# Patient Record
Sex: MALE | Race: WHITE | NOT HISPANIC OR LATINO
[De-identification: names, ages, dates, MRNs, and addresses within clinical notes are randomized per-mention and may not be internally consistent; named-entity substitution may affect disease eponyms.]

---

## 2017-04-14 ENCOUNTER — APPOINTMENT (OUTPATIENT)
Dept: PULMONOLOGY | Facility: CLINIC | Age: 62
End: 2017-04-14

## 2017-04-14 VITALS
HEART RATE: 72 BPM | WEIGHT: 199 LBS | RESPIRATION RATE: 12 BRPM | HEIGHT: 68 IN | SYSTOLIC BLOOD PRESSURE: 120 MMHG | BODY MASS INDEX: 30.16 KG/M2 | DIASTOLIC BLOOD PRESSURE: 84 MMHG | OXYGEN SATURATION: 100 %

## 2017-04-14 DIAGNOSIS — Z87.39 PERSONAL HISTORY OF OTHER DISEASES OF THE MUSCULOSKELETAL SYSTEM AND CONNECTIVE TISSUE: ICD-10-CM

## 2017-04-14 DIAGNOSIS — Z82.49 FAMILY HISTORY OF ISCHEMIC HEART DISEASE AND OTHER DISEASES OF THE CIRCULATORY SYSTEM: ICD-10-CM

## 2017-04-14 DIAGNOSIS — K21.9 GASTRO-ESOPHAGEAL REFLUX DISEASE W/OUT ESOPHAGITIS: ICD-10-CM

## 2017-04-14 DIAGNOSIS — Z78.9 OTHER SPECIFIED HEALTH STATUS: ICD-10-CM

## 2017-04-14 DIAGNOSIS — Z86.69 PERSONAL HISTORY OF OTHER DISEASES OF THE NERVOUS SYSTEM AND SENSE ORGANS: ICD-10-CM

## 2017-04-14 DIAGNOSIS — Z00.00 ENCOUNTER FOR GENERAL ADULT MEDICAL EXAMINATION W/OUT ABNORMAL FINDINGS: ICD-10-CM

## 2017-05-12 ENCOUNTER — APPOINTMENT (OUTPATIENT)
Dept: GASTROENTEROLOGY | Facility: CLINIC | Age: 62
End: 2017-05-12

## 2017-05-12 VITALS
WEIGHT: 190 LBS | HEIGHT: 68 IN | DIASTOLIC BLOOD PRESSURE: 94 MMHG | SYSTOLIC BLOOD PRESSURE: 141 MMHG | BODY MASS INDEX: 28.79 KG/M2 | RESPIRATION RATE: 14 BRPM | HEART RATE: 69 BPM

## 2017-05-12 DIAGNOSIS — Z86.39 PERSONAL HISTORY OF OTHER ENDOCRINE, NUTRITIONAL AND METABOLIC DISEASE: ICD-10-CM

## 2017-05-12 DIAGNOSIS — M1A.9XX0 CHRONIC GOUT, UNSPECIFIED, W/OUT TOPHUS (TOPHI): ICD-10-CM

## 2017-05-12 DIAGNOSIS — M50.20 OTHER CERVICAL DISC DISPLACEMENT, UNSPECIFIED CERVICAL REGION: ICD-10-CM

## 2017-05-12 DIAGNOSIS — I10 ESSENTIAL (PRIMARY) HYPERTENSION: ICD-10-CM

## 2017-05-12 DIAGNOSIS — M19.90 UNSPECIFIED OSTEOARTHRITIS, UNSPECIFIED SITE: ICD-10-CM

## 2017-05-20 ENCOUNTER — OUTPATIENT (OUTPATIENT)
Dept: OUTPATIENT SERVICES | Facility: HOSPITAL | Age: 62
LOS: 1 days | End: 2017-05-20
Payer: COMMERCIAL

## 2017-05-20 DIAGNOSIS — G47.33 OBSTRUCTIVE SLEEP APNEA (ADULT) (PEDIATRIC): ICD-10-CM

## 2017-05-20 PROCEDURE — 95810 POLYSOM 6/> YRS 4/> PARAM: CPT | Mod: 26

## 2017-05-20 PROCEDURE — 95810 POLYSOM 6/> YRS 4/> PARAM: CPT

## 2017-06-12 ENCOUNTER — APPOINTMENT (OUTPATIENT)
Dept: PULMONOLOGY | Facility: CLINIC | Age: 62
End: 2017-06-12

## 2017-06-12 VITALS — HEART RATE: 75 BPM | DIASTOLIC BLOOD PRESSURE: 60 MMHG | OXYGEN SATURATION: 98 % | SYSTOLIC BLOOD PRESSURE: 130 MMHG

## 2017-06-12 VITALS — BODY MASS INDEX: 28.59 KG/M2 | WEIGHT: 188 LBS

## 2017-06-12 DIAGNOSIS — Z87.438 PERSONAL HISTORY OF OTHER DISEASES OF MALE GENITAL ORGANS: ICD-10-CM

## 2017-06-12 RX ORDER — FEBUXOSTAT 80 MG/1
80 TABLET ORAL
Qty: 30 | Refills: 0 | Status: DISCONTINUED | COMMUNITY
Start: 2016-11-07 | End: 2017-06-12

## 2017-06-12 RX ORDER — DIAZEPAM 5 MG/1
5 TABLET ORAL
Refills: 0 | Status: DISCONTINUED | COMMUNITY
End: 2017-06-12

## 2017-07-09 ENCOUNTER — OUTPATIENT (OUTPATIENT)
Dept: OUTPATIENT SERVICES | Facility: HOSPITAL | Age: 62
LOS: 1 days | End: 2017-07-09
Payer: COMMERCIAL

## 2017-07-09 DIAGNOSIS — G47.30 SLEEP APNEA, UNSPECIFIED: ICD-10-CM

## 2017-07-09 PROCEDURE — 95811 POLYSOM 6/>YRS CPAP 4/> PARM: CPT | Mod: 26

## 2017-07-09 PROCEDURE — 95811 POLYSOM 6/>YRS CPAP 4/> PARM: CPT

## 2017-07-24 ENCOUNTER — APPOINTMENT (OUTPATIENT)
Dept: GASTROENTEROLOGY | Facility: GI CENTER | Age: 62
End: 2017-07-24

## 2017-08-16 ENCOUNTER — APPOINTMENT (OUTPATIENT)
Dept: PULMONOLOGY | Facility: CLINIC | Age: 62
End: 2017-08-16
Payer: COMMERCIAL

## 2017-08-16 VITALS
HEIGHT: 68 IN | DIASTOLIC BLOOD PRESSURE: 70 MMHG | SYSTOLIC BLOOD PRESSURE: 130 MMHG | WEIGHT: 189 LBS | HEART RATE: 78 BPM | BODY MASS INDEX: 28.64 KG/M2 | OXYGEN SATURATION: 97 %

## 2017-08-16 PROCEDURE — 99214 OFFICE O/P EST MOD 30 MIN: CPT

## 2017-08-16 RX ORDER — AZELASTINE HYDROCHLORIDE 137 UG/1
0.1 SPRAY, METERED NASAL
Qty: 30 | Refills: 0 | Status: DISCONTINUED | COMMUNITY
Start: 2016-12-29

## 2017-08-16 RX ORDER — OMEPRAZOLE 40 MG/1
40 CAPSULE, DELAYED RELEASE ORAL
Qty: 90 | Refills: 0 | Status: DISCONTINUED | COMMUNITY
Start: 2017-06-29

## 2017-08-16 RX ORDER — METHOCARBAMOL 500 MG/1
500 TABLET, FILM COATED ORAL
Qty: 40 | Refills: 0 | Status: DISCONTINUED | COMMUNITY
Start: 2017-06-30

## 2017-08-16 RX ORDER — ATENOLOL 25 MG/1
25 TABLET ORAL
Refills: 0 | Status: DISCONTINUED | COMMUNITY
End: 2017-08-16

## 2017-08-16 RX ORDER — NEBIVOLOL HYDROCHLORIDE 10 MG/1
10 TABLET ORAL
Qty: 30 | Refills: 0 | Status: DISCONTINUED | COMMUNITY
Start: 2017-06-29

## 2017-09-27 ENCOUNTER — APPOINTMENT (OUTPATIENT)
Dept: PULMONOLOGY | Facility: CLINIC | Age: 62
End: 2017-09-27
Payer: COMMERCIAL

## 2017-09-27 VITALS
OXYGEN SATURATION: 96 % | WEIGHT: 190 LBS | SYSTOLIC BLOOD PRESSURE: 128 MMHG | BODY MASS INDEX: 28.89 KG/M2 | DIASTOLIC BLOOD PRESSURE: 80 MMHG | HEART RATE: 66 BPM

## 2017-09-27 DIAGNOSIS — E66.9 OBESITY, UNSPECIFIED: ICD-10-CM

## 2017-09-27 PROCEDURE — 99214 OFFICE O/P EST MOD 30 MIN: CPT

## 2017-12-12 ENCOUNTER — OTHER (OUTPATIENT)
Age: 62
End: 2017-12-12

## 2017-12-12 ENCOUNTER — APPOINTMENT (OUTPATIENT)
Dept: PULMONOLOGY | Facility: CLINIC | Age: 62
End: 2017-12-12
Payer: COMMERCIAL

## 2017-12-12 VITALS
WEIGHT: 192 LBS | HEART RATE: 68 BPM | BODY MASS INDEX: 29.19 KG/M2 | DIASTOLIC BLOOD PRESSURE: 78 MMHG | SYSTOLIC BLOOD PRESSURE: 126 MMHG | OXYGEN SATURATION: 97 %

## 2017-12-12 PROCEDURE — 99214 OFFICE O/P EST MOD 30 MIN: CPT

## 2017-12-20 ENCOUNTER — OUTPATIENT (OUTPATIENT)
Dept: OUTPATIENT SERVICES | Facility: HOSPITAL | Age: 62
LOS: 1 days | End: 2017-12-20
Payer: COMMERCIAL

## 2017-12-20 ENCOUNTER — RESULT REVIEW (OUTPATIENT)
Age: 62
End: 2017-12-20

## 2017-12-20 ENCOUNTER — APPOINTMENT (OUTPATIENT)
Dept: GASTROENTEROLOGY | Facility: GI CENTER | Age: 62
End: 2017-12-20
Payer: COMMERCIAL

## 2017-12-20 DIAGNOSIS — K22.70 BARRETT'S ESOPHAGUS WITHOUT DYSPLASIA: ICD-10-CM

## 2017-12-20 DIAGNOSIS — K21.0 GASTRO-ESOPHAGEAL REFLUX DISEASE WITH ESOPHAGITIS: ICD-10-CM

## 2017-12-20 PROCEDURE — 43239 EGD BIOPSY SINGLE/MULTIPLE: CPT

## 2017-12-20 PROCEDURE — 88305 TISSUE EXAM BY PATHOLOGIST: CPT

## 2017-12-20 PROCEDURE — 88305 TISSUE EXAM BY PATHOLOGIST: CPT | Mod: 26

## 2017-12-22 LAB — SURGICAL PATHOLOGY FINAL REPORT - CH: SIGNIFICANT CHANGE UP

## 2018-02-07 ENCOUNTER — APPOINTMENT (OUTPATIENT)
Dept: GASTROENTEROLOGY | Facility: CLINIC | Age: 63
End: 2018-02-07
Payer: COMMERCIAL

## 2018-02-07 VITALS
BODY MASS INDEX: 27.22 KG/M2 | WEIGHT: 188 LBS | HEIGHT: 69.5 IN | HEART RATE: 69 BPM | SYSTOLIC BLOOD PRESSURE: 141 MMHG | DIASTOLIC BLOOD PRESSURE: 88 MMHG | RESPIRATION RATE: 16 BRPM

## 2018-02-07 PROCEDURE — 99214 OFFICE O/P EST MOD 30 MIN: CPT

## 2018-02-07 RX ORDER — NEBIVOLOL HYDROCHLORIDE 5 MG/1
5 TABLET ORAL
Refills: 0 | Status: COMPLETED | COMMUNITY
End: 2018-02-07

## 2018-02-07 RX ORDER — NORTRIPTYLINE HYDROCHLORIDE 25 MG/1
25 CAPSULE ORAL
Refills: 0 | Status: COMPLETED | COMMUNITY
End: 2018-02-07

## 2018-06-12 ENCOUNTER — APPOINTMENT (OUTPATIENT)
Dept: PULMONOLOGY | Facility: CLINIC | Age: 63
End: 2018-06-12
Payer: COMMERCIAL

## 2018-06-12 VITALS
OXYGEN SATURATION: 98 % | HEART RATE: 73 BPM | DIASTOLIC BLOOD PRESSURE: 84 MMHG | SYSTOLIC BLOOD PRESSURE: 128 MMHG | WEIGHT: 196 LBS | BODY MASS INDEX: 28.53 KG/M2

## 2018-06-12 PROCEDURE — 99214 OFFICE O/P EST MOD 30 MIN: CPT | Mod: 25

## 2018-06-12 PROCEDURE — 94010 BREATHING CAPACITY TEST: CPT

## 2018-06-12 RX ORDER — VERAPAMIL HYDROCHLORIDE 180 MG/1
180 CAPSULE, DELAYED RELEASE PELLETS ORAL
Refills: 0 | Status: DISCONTINUED | COMMUNITY
End: 2018-06-12

## 2018-06-12 RX ORDER — DULOXETINE HYDROCHLORIDE 30 MG/1
30 CAPSULE, DELAYED RELEASE PELLETS ORAL
Refills: 0 | Status: DISCONTINUED | COMMUNITY
End: 2018-06-12

## 2018-09-12 ENCOUNTER — OTHER (OUTPATIENT)
Age: 63
End: 2018-09-12

## 2018-09-12 DIAGNOSIS — R19.7 DIARRHEA, UNSPECIFIED: ICD-10-CM

## 2018-09-13 ENCOUNTER — LABORATORY RESULT (OUTPATIENT)
Age: 63
End: 2018-09-13

## 2018-09-14 LAB
ANION GAP SERPL CALC-SCNC: 17 MMOL/L
BASOPHILS # BLD AUTO: 0.01 K/UL
BASOPHILS NFR BLD AUTO: 0.2 %
BUN SERPL-MCNC: 13 MG/DL
CALCIUM SERPL-MCNC: 9.8 MG/DL
CHLORIDE SERPL-SCNC: 105 MMOL/L
CO2 SERPL-SCNC: 22 MMOL/L
CREAT SERPL-MCNC: 0.95 MG/DL
CRP SERPL-MCNC: 0.15 MG/DL
ENDOMYSIUM IGA SER QL: NEGATIVE
ENDOMYSIUM IGA TITR SER: NORMAL
EOSINOPHIL # BLD AUTO: 0.16 K/UL
EOSINOPHIL NFR BLD AUTO: 2.5 %
GLIADIN IGA SER QL: <5 UNITS
GLIADIN IGG SER QL: <5 UNITS
GLIADIN PEPTIDE IGA SER-ACNC: NEGATIVE
GLIADIN PEPTIDE IGG SER-ACNC: NEGATIVE
GLUCOSE BS SERPL-MCNC: 78 MG/DL
GLUCOSE SERPL-MCNC: 88 MG/DL
HCT VFR BLD CALC: 45.5 %
HGB BLD-MCNC: 15 G/DL
IGA SER QL IEP: 223 MG/DL
IMM GRANULOCYTES NFR BLD AUTO: 0.9 %
LYMPHOCYTES # BLD AUTO: 1.48 K/UL
LYMPHOCYTES NFR BLD AUTO: 22.7 %
MAN DIFF?: NORMAL
MCHC RBC-ENTMCNC: 33 GM/DL
MCHC RBC-ENTMCNC: 33.3 PG
MCV RBC AUTO: 101.1 FL
MONOCYTES # BLD AUTO: 0.51 K/UL
MONOCYTES NFR BLD AUTO: 7.8 %
NEUTROPHILS # BLD AUTO: 4.29 K/UL
NEUTROPHILS NFR BLD AUTO: 65.9 %
PLATELET # BLD AUTO: NORMAL K/UL
POTASSIUM SERPL-SCNC: 4.1 MMOL/L
RBC # BLD: 4.5 M/UL
RBC # FLD: 13.1 %
SODIUM SERPL-SCNC: 144 MMOL/L
T3FREE SERPL-MCNC: 3.39 PG/ML
T4 FREE SERPL-MCNC: 1.2 NG/DL
TSH SERPL-ACNC: 1.19 UIU/ML
TTG IGA SER IA-ACNC: <5 UNITS
TTG IGA SER-ACNC: NEGATIVE
TTG IGG SER IA-ACNC: <5 UNITS
TTG IGG SER IA-ACNC: NEGATIVE
WBC # FLD AUTO: 6.51 K/UL

## 2018-09-17 LAB
BACTERIA STL CULT: NORMAL
C DIFF TOX B STL QL CT TISS CULT: NORMAL

## 2018-10-05 ENCOUNTER — APPOINTMENT (OUTPATIENT)
Dept: GASTROENTEROLOGY | Facility: CLINIC | Age: 63
End: 2018-10-05
Payer: COMMERCIAL

## 2018-10-05 VITALS
SYSTOLIC BLOOD PRESSURE: 136 MMHG | WEIGHT: 191 LBS | HEIGHT: 69.5 IN | HEART RATE: 72 BPM | DIASTOLIC BLOOD PRESSURE: 82 MMHG | BODY MASS INDEX: 27.65 KG/M2

## 2018-10-05 PROCEDURE — 99215 OFFICE O/P EST HI 40 MIN: CPT

## 2018-11-14 ENCOUNTER — RX RENEWAL (OUTPATIENT)
Age: 63
End: 2018-11-14

## 2018-12-10 ENCOUNTER — APPOINTMENT (OUTPATIENT)
Dept: PULMONOLOGY | Facility: CLINIC | Age: 63
End: 2018-12-10
Payer: COMMERCIAL

## 2018-12-10 VITALS
BODY MASS INDEX: 28.24 KG/M2 | WEIGHT: 194 LBS | DIASTOLIC BLOOD PRESSURE: 70 MMHG | OXYGEN SATURATION: 97 % | HEART RATE: 72 BPM | SYSTOLIC BLOOD PRESSURE: 130 MMHG

## 2018-12-10 PROCEDURE — 99214 OFFICE O/P EST MOD 30 MIN: CPT

## 2018-12-11 ENCOUNTER — APPOINTMENT (OUTPATIENT)
Dept: GASTROENTEROLOGY | Facility: CLINIC | Age: 63
End: 2018-12-11
Payer: COMMERCIAL

## 2018-12-11 VITALS — WEIGHT: 191 LBS | OXYGEN SATURATION: 98 % | RESPIRATION RATE: 15 BRPM | HEIGHT: 69.5 IN | BODY MASS INDEX: 27.65 KG/M2

## 2018-12-11 VITALS — DIASTOLIC BLOOD PRESSURE: 89 MMHG | SYSTOLIC BLOOD PRESSURE: 145 MMHG | HEART RATE: 73 BPM

## 2018-12-11 PROCEDURE — 99214 OFFICE O/P EST MOD 30 MIN: CPT

## 2019-01-04 ENCOUNTER — RX RENEWAL (OUTPATIENT)
Age: 64
End: 2019-01-04

## 2019-01-07 ENCOUNTER — RX RENEWAL (OUTPATIENT)
Age: 64
End: 2019-01-07

## 2019-03-08 ENCOUNTER — RX RENEWAL (OUTPATIENT)
Age: 64
End: 2019-03-08

## 2019-03-10 ENCOUNTER — RX RENEWAL (OUTPATIENT)
Age: 64
End: 2019-03-10

## 2019-06-19 ENCOUNTER — MEDICATION RENEWAL (OUTPATIENT)
Age: 64
End: 2019-06-19

## 2019-06-25 ENCOUNTER — RX RENEWAL (OUTPATIENT)
Age: 64
End: 2019-06-25

## 2019-07-01 ENCOUNTER — MEDICATION RENEWAL (OUTPATIENT)
Age: 64
End: 2019-07-01

## 2019-08-16 ENCOUNTER — APPOINTMENT (OUTPATIENT)
Dept: GASTROENTEROLOGY | Facility: CLINIC | Age: 64
End: 2019-08-16
Payer: COMMERCIAL

## 2019-08-16 VITALS
WEIGHT: 214 LBS | OXYGEN SATURATION: 98 % | SYSTOLIC BLOOD PRESSURE: 153 MMHG | RESPIRATION RATE: 15 BRPM | DIASTOLIC BLOOD PRESSURE: 96 MMHG | HEIGHT: 69.5 IN | HEART RATE: 75 BPM | BODY MASS INDEX: 30.98 KG/M2

## 2019-08-16 DIAGNOSIS — R51 HEADACHE: ICD-10-CM

## 2019-08-16 PROCEDURE — 99214 OFFICE O/P EST MOD 30 MIN: CPT

## 2019-08-16 NOTE — PHYSICAL EXAM
[General Appearance - Alert] : alert [General Appearance - In No Acute Distress] : in no acute distress [Sclera] : the sclera and conjunctiva were normal [PERRL With Normal Accommodation] : pupils were equal in size, round, and reactive to light [Extraocular Movements] : extraocular movements were intact [Oropharynx] : the oropharynx was normal [Outer Ear] : the ears and nose were normal in appearance [Jugular Venous Distention Increased] : there was no jugular-venous distention [Neck Appearance] : the appearance of the neck was normal [Neck Cervical Mass (___cm)] : no neck mass was observed [Thyroid Diffuse Enlargement] : the thyroid was not enlarged [Thyroid Nodule] : there were no palpable thyroid nodules [Auscultation Breath Sounds / Voice Sounds] : lungs were clear to auscultation bilaterally [Heart Rate And Rhythm] : heart rate was normal and rhythm regular [Heart Sounds] : normal S1 and S2 [Heart Sounds Gallop] : no gallops [Murmurs] : no murmurs [Heart Sounds Pericardial Friction Rub] : no pericardial rub [Full Pulse] : the pedal pulses are present [Edema] : there was no peripheral edema [Bowel Sounds] : normal bowel sounds [Abdomen Tenderness] : non-tender [Abdomen Soft] : soft [Abdomen Mass (___ Cm)] : no abdominal mass palpated [Cervical Lymph Nodes Enlarged Posterior Bilaterally] : posterior cervical [Cervical Lymph Nodes Enlarged Anterior Bilaterally] : anterior cervical [Axillary Lymph Nodes Enlarged Bilaterally] : axillary [Supraclavicular Lymph Nodes Enlarged Bilaterally] : supraclavicular [Inguinal Lymph Nodes Enlarged Bilaterally] : inguinal [Femoral Lymph Nodes Enlarged Bilaterally] : femoral [Abnormal Walk] : normal gait [Nail Clubbing] : no clubbing  or cyanosis of the fingernails [Musculoskeletal - Swelling] : no joint swelling seen [Motor Tone] : muscle strength and tone were normal [Skin Turgor] : normal skin turgor [Skin Color & Pigmentation] : normal skin color and pigmentation [] : no rash [Oriented To Time, Place, And Person] : oriented to person, place, and time [No Focal Deficits] : no focal deficits [Impaired Insight] : insight and judgment were intact [Affect] : the affect was normal

## 2019-08-16 NOTE — HISTORY OF PRESENT ILLNESS
[de-identified] : The patient arrived for followup. He has history of acid reflux and diverticulosis. He is taking fiber supplementation and is passing bowel movements regularly. He is taking PPI and H2 blocker for his reflux. He is also complaining of chronic headaches for which he has undergone extensive neurology evaluation. Now he is being evaluated by psychiatry. His GI symptoms on controlled.

## 2019-08-16 NOTE — ASSESSMENT
[FreeTextEntry1] : The patient is doing okay from GI point of view. I am going to recommend to stop the PPI after tapering. He can continue the H2 blocker. Hopefully that can help with the headache. Followup in 3-4 months. Recommended to him to call me if there is any questions or concerns.\par \par Donte Lamar MD\par Gastroenterology \par \par

## 2019-09-24 ENCOUNTER — APPOINTMENT (OUTPATIENT)
Dept: GASTROENTEROLOGY | Facility: CLINIC | Age: 64
End: 2019-09-24

## 2019-10-02 ENCOUNTER — APPOINTMENT (OUTPATIENT)
Dept: GASTROENTEROLOGY | Facility: CLINIC | Age: 64
End: 2019-10-02
Payer: COMMERCIAL

## 2019-10-02 VITALS
SYSTOLIC BLOOD PRESSURE: 130 MMHG | OXYGEN SATURATION: 98 % | DIASTOLIC BLOOD PRESSURE: 84 MMHG | BODY MASS INDEX: 29.68 KG/M2 | WEIGHT: 205 LBS | RESPIRATION RATE: 16 BRPM | HEIGHT: 69.5 IN | HEART RATE: 70 BPM

## 2019-10-02 PROCEDURE — 99214 OFFICE O/P EST MOD 30 MIN: CPT

## 2019-10-02 NOTE — ASSESSMENT
[FreeTextEntry1] : Due to the new warning about Zantac, I recommended to stop it. I recommended to increase his PPI to once daily instead of every other day. If he has any worsening symptoms, we can add famotidine at bedtime. Followup on as needed basis

## 2019-10-02 NOTE — PHYSICAL EXAM
[General Appearance - Alert] : alert [General Appearance - In No Acute Distress] : in no acute distress [Sclera] : the sclera and conjunctiva were normal [PERRL With Normal Accommodation] : pupils were equal in size, round, and reactive to light [Extraocular Movements] : extraocular movements were intact [Oropharynx] : the oropharynx was normal [Outer Ear] : the ears and nose were normal in appearance [Neck Appearance] : the appearance of the neck was normal [Jugular Venous Distention Increased] : there was no jugular-venous distention [Neck Cervical Mass (___cm)] : no neck mass was observed [Thyroid Diffuse Enlargement] : the thyroid was not enlarged [Thyroid Nodule] : there were no palpable thyroid nodules [Auscultation Breath Sounds / Voice Sounds] : lungs were clear to auscultation bilaterally [Heart Rate And Rhythm] : heart rate was normal and rhythm regular [Heart Sounds] : normal S1 and S2 [Murmurs] : no murmurs [Heart Sounds Gallop] : no gallops [Heart Sounds Pericardial Friction Rub] : no pericardial rub [Full Pulse] : the pedal pulses are present [Edema] : there was no peripheral edema [Abdomen Soft] : soft [Bowel Sounds] : normal bowel sounds [Abdomen Tenderness] : non-tender [Abdomen Mass (___ Cm)] : no abdominal mass palpated [Cervical Lymph Nodes Enlarged Anterior Bilaterally] : anterior cervical [Cervical Lymph Nodes Enlarged Posterior Bilaterally] : posterior cervical [Supraclavicular Lymph Nodes Enlarged Bilaterally] : supraclavicular [Femoral Lymph Nodes Enlarged Bilaterally] : femoral [Axillary Lymph Nodes Enlarged Bilaterally] : axillary [Inguinal Lymph Nodes Enlarged Bilaterally] : inguinal [Abnormal Walk] : normal gait [Nail Clubbing] : no clubbing  or cyanosis of the fingernails [Musculoskeletal - Swelling] : no joint swelling seen [Skin Color & Pigmentation] : normal skin color and pigmentation [Motor Tone] : muscle strength and tone were normal [Skin Turgor] : normal skin turgor [] : no rash [Oriented To Time, Place, And Person] : oriented to person, place, and time [No Focal Deficits] : no focal deficits [Affect] : the affect was normal [Impaired Insight] : insight and judgment were intact

## 2019-10-02 NOTE — HISTORY OF PRESENT ILLNESS
[de-identified] : Patient for a followup for GI symptoms. He is on PPI every other day and H2 blocker at bedtime. He has no GI symptoms currently. He is still struggling with his headaches. He is under treatment with a neurologist and a psychiatrist. He will be undergoing an MRI soon.

## 2019-12-13 ENCOUNTER — APPOINTMENT (OUTPATIENT)
Dept: PULMONOLOGY | Facility: CLINIC | Age: 64
End: 2019-12-13
Payer: COMMERCIAL

## 2019-12-13 VITALS
DIASTOLIC BLOOD PRESSURE: 80 MMHG | BODY MASS INDEX: 30.55 KG/M2 | OXYGEN SATURATION: 98 % | SYSTOLIC BLOOD PRESSURE: 130 MMHG | HEART RATE: 74 BPM | WEIGHT: 211 LBS | HEIGHT: 69.5 IN

## 2019-12-13 PROCEDURE — 99214 OFFICE O/P EST MOD 30 MIN: CPT

## 2019-12-13 RX ORDER — SERTRALINE 25 MG/1
TABLET, FILM COATED ORAL
Refills: 0 | Status: DISCONTINUED | COMMUNITY
End: 2019-12-13

## 2019-12-13 RX ORDER — BUTALBITAL AND ACETAMINOPHEN 300; 50 MG/1; MG/1
50-300 TABLET ORAL
Refills: 0 | Status: DISCONTINUED | COMMUNITY
End: 2019-12-13

## 2019-12-13 RX ORDER — OLANZAPINE 5 MG/1
5 TABLET, FILM COATED ORAL
Refills: 0 | Status: DISCONTINUED | COMMUNITY
End: 2019-12-13

## 2019-12-13 NOTE — DISCUSSION/SUMMARY
[FreeTextEntry1] : \par #1. CPAP titration study to treat moderate SHELL revealed an optimal pressure of 12 cm of water; he has excellent compliance\par #2. Diet and exercise for weight loss\par #3. SOBOE is likely related to weight given normal PFTs and spirometry\par #4. F/u in 12 months to reassess response to CPAP\par #5. Neurology f/u for H/A\par #6. Lung function has been essentially normal\par #7. The patient does not appear to require BD therapy at this time\par #8. Replace equipment as needed; ordered 12/13/19

## 2019-12-13 NOTE — REVIEW OF SYSTEMS
[Dry Eyes] : dryness of the eyes [Sinus Problems] : sinus problems [Hypertension] : ~T hypertension [Headache] : headache [Anxiety] : anxiety [Depression] : depression [As Noted in HPI] : as noted in HPI [Fever] : no fever [Chills] : no chills [Eye Irritation] : no ~T irritation of the eyes [Nasal Congestion] : no nasal congestion [Epistaxis] : no nosebleeds [Postnasal Drip] : no postnasal drip [Cough] : no cough [Dyspnea] : no dyspnea [Sputum] : not coughing up ~M sputum [Pleuritic Pain] : no pleuritic pain [Chest Tightness] : no chest tightness [Chest Discomfort] : no chest discomfort [Wheezing] : no wheezing [Dysrhythmia] : no dysrhythmia [Murmurs] : no murmurs were heard [Palpitations] : no palpitations [Edema] : ~T edema was not present [Hay Fever] : no hay fever [Itchy Eyes] : no itching of ~T the eyes [Reflux] : no reflux [Nausea] : no nausea [Vomiting] : no vomiting [Constipation] : no constipation [Diarrhea] : no diarrhea [Dysuria] : no dysuria [Abdominal Pain] : no abdominal pain [Trauma] : no ~T physical trauma [Fracture] : no fracture [Anemia] : no anemia [Dizziness] : no dizziness [Syncope] : no fainting [Paralysis] : no paralysis was seen [Numbness] : no numbness [Diabetes] : no diabetes mellitus [Seizures] : no seizures [Thyroid Problem] : no thyroid problem

## 2019-12-13 NOTE — CONSULT LETTER
[Dear  ___] : Dear  [unfilled], [Consult Letter:] : I had the pleasure of evaluating your patient, [unfilled]. [Please see my note below.] : Please see my note below. [Consult Closing:] : Thank you very much for allowing me to participate in the care of this patient.  If you have any questions, please do not hesitate to contact me. [Sincerely,] : Sincerely, [DrJenifer  ___] : Dr. HERNANDEZ [Deng Davis MD] : Deng Davis MD [DrJenifer ___] : Dr. HERNANDEZ [FreeTextEntry3] : Deng Davis MD, FCCP, D. ABSM\par Pulmonary and Sleep Medicine\par [facility] [place]

## 2019-12-13 NOTE — PHYSICAL EXAM
[General Appearance - Well Developed] : well developed [Normal Appearance] : normal appearance [General Appearance - In No Acute Distress] : no acute distress [Normal Conjunctiva] : the conjunctiva exhibited no abnormalities [Low Lying Soft Palate] : low lying soft palate [Elongated Uvula] : elongated uvula [Enlarged Base of the Tongue] : enlargement of the base of the tongue [Neck Appearance] : the appearance of the neck was normal [III] : III [Heart Rate And Rhythm] : heart rate and rhythm were normal [Edema] : no peripheral edema present [Heart Sounds] : normal S1 and S2 [Murmurs] : no murmurs present [] : no respiratory distress [Respiration, Rhythm And Depth] : normal respiratory rhythm and effort [Exaggerated Use Of Accessory Muscles For Inspiration] : no accessory muscle use [Auscultation Breath Sounds / Voice Sounds] : lungs were clear to auscultation bilaterally [Abdomen Soft] : soft [Abdomen Tenderness] : non-tender [Nail Clubbing] : no clubbing of the fingernails [Abnormal Walk] : normal gait [No Focal Deficits] : no focal deficits [Oriented To Time, Place, And Person] : oriented to person, place, and time [Cyanosis, Localized] : no localized cyanosis [FreeTextEntry1] : No abnormalities

## 2019-12-13 NOTE — PROCEDURE
[FreeTextEntry1] : PFTs performed previously were normal.\par Spirometry performed subsequently was essentially normal

## 2019-12-13 NOTE — REASON FOR VISIT
[Follow-Up] : a follow-up visit [Shortness of Breath] : shortness of Breath [Sleep Apnea] : sleep apnea [FreeTextEntry2] : obesity

## 2019-12-13 NOTE — HISTORY OF PRESENT ILLNESS
[FreeTextEntry1] : Patient with mild SOBOE but is otherwise without respiratory complaints.\par He is s/p hernia surgery.\par He now follows with neurology for H/A.

## 2019-12-19 ENCOUNTER — MEDICATION RENEWAL (OUTPATIENT)
Age: 64
End: 2019-12-19

## 2020-12-18 ENCOUNTER — APPOINTMENT (OUTPATIENT)
Dept: PULMONOLOGY | Facility: CLINIC | Age: 65
End: 2020-12-18
Payer: MEDICARE

## 2020-12-18 VITALS
DIASTOLIC BLOOD PRESSURE: 94 MMHG | BODY MASS INDEX: 28.09 KG/M2 | OXYGEN SATURATION: 96 % | HEART RATE: 80 BPM | WEIGHT: 193 LBS | SYSTOLIC BLOOD PRESSURE: 140 MMHG

## 2020-12-18 PROCEDURE — 99072 ADDL SUPL MATRL&STAF TM PHE: CPT

## 2020-12-18 PROCEDURE — 99214 OFFICE O/P EST MOD 30 MIN: CPT

## 2020-12-18 RX ORDER — DULOXETINE HYDROCHLORIDE 60 MG/1
60 CAPSULE, DELAYED RELEASE ORAL
Refills: 0 | Status: DISCONTINUED | COMMUNITY
End: 2020-12-18

## 2020-12-18 RX ORDER — RANITIDINE 150 MG/1
150 TABLET ORAL
Qty: 90 | Refills: 1 | Status: DISCONTINUED | COMMUNITY
Start: 2018-10-05 | End: 2020-12-18

## 2020-12-18 NOTE — CONSULT LETTER
[Dear  ___] : Dear  [unfilled], [Consult Letter:] : I had the pleasure of evaluating your patient, [unfilled]. [Please see my note below.] : Please see my note below. [Consult Closing:] : Thank you very much for allowing me to participate in the care of this patient.  If you have any questions, please do not hesitate to contact me. [Sincerely,] : Sincerely, [DrJenifer  ___] : Dr. HERNANDEZ [DrJenifer ___] : Dr. HERNANDEZ [Deng Davis MD] : Deng Davis MD [FreeTextEntry3] : Deng Davis MD, FCCP, D. ABSM\par Pulmonary and Sleep Medicine\par [facility] [place]

## 2020-12-18 NOTE — DISCUSSION/SUMMARY
[FreeTextEntry1] : \par #1. CPAP titration study to treat moderate SHELL revealed an optimal pressure of 12 cm of water; he has excellent compliance\par #2. Diet and exercise for weight loss\par #3. SOBOE is likely related to weight given normal PFTs and spirometry\par #4. F/u in 6 months to reassess response to CPAP with the AirFit N30i\par #5. Neurology f/u for H/A\par #6. Lung function was essentially normal\par #7. The patient does not appear to require BD therapy at this time\par #8. Replace equipment as needed; ordered 12/18/20\par #9. Reviewed risks of exposure and symptoms of Covid-19 virus, including how the virus is spread and precautions to avoid marie virus.\par \par Patient's questions were answered and patient appears to understand these recommendations\par

## 2020-12-18 NOTE — REVIEW OF SYSTEMS
[Dry Eyes] : dryness of the eyes [Sinus Problems] : sinus problems [Hypertension] : ~T hypertension [Headache] : headache [Depression] : depression [Anxiety] : anxiety [As Noted in HPI] : as noted in HPI [Fever] : no fever [Chills] : no chills [Eye Irritation] : no ~T irritation of the eyes [Nasal Congestion] : no nasal congestion [Epistaxis] : no nosebleeds [Postnasal Drip] : no postnasal drip [Cough] : no cough [Sputum] : not coughing up ~M sputum [Dyspnea] : no dyspnea [Chest Tightness] : no chest tightness [Pleuritic Pain] : no pleuritic pain [Wheezing] : no wheezing [Chest Discomfort] : no chest discomfort [Dysrhythmia] : no dysrhythmia [Murmurs] : no murmurs were heard [Palpitations] : no palpitations [Edema] : ~T edema was not present [Hay Fever] : no hay fever [Itchy Eyes] : no itching of ~T the eyes [Reflux] : no reflux [Nausea] : no nausea [Vomiting] : no vomiting [Constipation] : no constipation [Diarrhea] : no diarrhea [Abdominal Pain] : no abdominal pain [Dysuria] : no dysuria [Trauma] : no ~T physical trauma [Fracture] : no fracture [Anemia] : no anemia [Dizziness] : no dizziness [Syncope] : no fainting [Numbness] : no numbness [Paralysis] : no paralysis was seen [Seizures] : no seizures [Diabetes] : no diabetes mellitus [Thyroid Problem] : no thyroid problem

## 2020-12-23 ENCOUNTER — APPOINTMENT (OUTPATIENT)
Dept: GASTROENTEROLOGY | Facility: CLINIC | Age: 65
End: 2020-12-23
Payer: MEDICARE

## 2020-12-23 VITALS
WEIGHT: 200 LBS | TEMPERATURE: 98 F | HEIGHT: 69.5 IN | BODY MASS INDEX: 28.96 KG/M2 | HEART RATE: 79 BPM | SYSTOLIC BLOOD PRESSURE: 150 MMHG | DIASTOLIC BLOOD PRESSURE: 94 MMHG

## 2020-12-23 DIAGNOSIS — R19.4 CHANGE IN BOWEL HABIT: ICD-10-CM

## 2020-12-23 PROCEDURE — 99072 ADDL SUPL MATRL&STAF TM PHE: CPT

## 2020-12-23 PROCEDURE — 99214 OFFICE O/P EST MOD 30 MIN: CPT

## 2020-12-23 NOTE — ASSESSMENT
[FreeTextEntry1] :  I am recommending to schedule upper endoscopy and continue PPI. Regarding his change in bowel habits, I am recommending to start Metamucil fiber supplementation on an daily basis.\par \par Risks (including bleeding, pain, perforation, incomplete examination, adverse reactions to medications, aspiration and death), benefits and alternatives were discussed. Patient is agreeable for the EGD. The patient is medically optimized for the procedure. We will schedule the patient for the procedure.\par \par Donte Lamar MD\par Gastroenterology \par \par

## 2020-12-23 NOTE — PHYSICAL EXAM
[General Appearance - Alert] : alert [General Appearance - In No Acute Distress] : in no acute distress [Sclera] : the sclera and conjunctiva were normal [PERRL With Normal Accommodation] : pupils were equal in size, round, and reactive to light [Extraocular Movements] : extraocular movements were intact [Outer Ear] : the ears and nose were normal in appearance [Oropharynx] : the oropharynx was normal [Neck Appearance] : the appearance of the neck was normal [Neck Cervical Mass (___cm)] : no neck mass was observed [Jugular Venous Distention Increased] : there was no jugular-venous distention [Thyroid Diffuse Enlargement] : the thyroid was not enlarged [Thyroid Nodule] : there were no palpable thyroid nodules [Auscultation Breath Sounds / Voice Sounds] : lungs were clear to auscultation bilaterally [Heart Rate And Rhythm] : heart rate was normal and rhythm regular [Heart Sounds] : normal S1 and S2 [Heart Sounds Gallop] : no gallops [Murmurs] : no murmurs [Heart Sounds Pericardial Friction Rub] : no pericardial rub [Full Pulse] : the pedal pulses are present [Edema] : there was no peripheral edema [Bowel Sounds] : normal bowel sounds [Abdomen Soft] : soft [Abdomen Tenderness] : non-tender [Abdomen Mass (___ Cm)] : no abdominal mass palpated [Cervical Lymph Nodes Enlarged Posterior Bilaterally] : posterior cervical [Cervical Lymph Nodes Enlarged Anterior Bilaterally] : anterior cervical [Supraclavicular Lymph Nodes Enlarged Bilaterally] : supraclavicular [Axillary Lymph Nodes Enlarged Bilaterally] : axillary [Femoral Lymph Nodes Enlarged Bilaterally] : femoral [Inguinal Lymph Nodes Enlarged Bilaterally] : inguinal [Abnormal Walk] : normal gait [Nail Clubbing] : no clubbing  or cyanosis of the fingernails [Musculoskeletal - Swelling] : no joint swelling seen [Motor Tone] : muscle strength and tone were normal [Skin Color & Pigmentation] : normal skin color and pigmentation [Skin Turgor] : normal skin turgor [] : no rash [No Focal Deficits] : no focal deficits [Oriented To Time, Place, And Person] : oriented to person, place, and time [Impaired Insight] : insight and judgment were intact [Affect] : the affect was normal

## 2020-12-23 NOTE — HISTORY OF PRESENT ILLNESS
[de-identified] : The patient arrived for followup. 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 He also has significant headaches. He has been getting spinal tap, with blood patch.He is complaining of occasional loose stools and has been taking Imodium.His last colonoscopy had just shown diverticulosis.No rectal bleeding.

## 2021-01-30 DIAGNOSIS — Z01.818 ENCOUNTER FOR OTHER PREPROCEDURAL EXAMINATION: ICD-10-CM

## 2021-01-31 ENCOUNTER — APPOINTMENT (OUTPATIENT)
Dept: DISASTER EMERGENCY | Facility: CLINIC | Age: 66
End: 2021-01-31

## 2021-02-01 LAB — SARS-COV-2 N GENE NPH QL NAA+PROBE: NOT DETECTED

## 2021-02-03 ENCOUNTER — APPOINTMENT (OUTPATIENT)
Dept: GASTROENTEROLOGY | Facility: GI CENTER | Age: 66
End: 2021-02-03
Payer: MEDICARE

## 2021-02-03 ENCOUNTER — RESULT REVIEW (OUTPATIENT)
Age: 66
End: 2021-02-03

## 2021-02-03 ENCOUNTER — OUTPATIENT (OUTPATIENT)
Dept: OUTPATIENT SERVICES | Facility: HOSPITAL | Age: 66
LOS: 1 days | End: 2021-02-03
Payer: MEDICARE

## 2021-02-03 DIAGNOSIS — K22.70 BARRETT'S ESOPHAGUS WITHOUT DYSPLASIA: ICD-10-CM

## 2021-02-03 PROCEDURE — 43239 EGD BIOPSY SINGLE/MULTIPLE: CPT

## 2021-02-03 PROCEDURE — 88305 TISSUE EXAM BY PATHOLOGIST: CPT

## 2021-02-03 PROCEDURE — 88342 IMHCHEM/IMCYTCHM 1ST ANTB: CPT

## 2021-02-03 PROCEDURE — 88342 IMHCHEM/IMCYTCHM 1ST ANTB: CPT | Mod: 26

## 2021-02-03 PROCEDURE — 88305 TISSUE EXAM BY PATHOLOGIST: CPT | Mod: 26

## 2021-02-03 NOTE — ASSESSMENT
[FreeTextEntry1] : IMPRESSION:\par Irregular Z line \par Melvin esophagus\par Mild gastritis\par \par RECOMMENDATIONS:\par Await pathology results\par Treat for H. pylori if positive\par Repeat EGD based on pathology results

## 2021-02-03 NOTE — PROCEDURE
[Procedure Explained] : The procedure was explained [Allergies Reviewed] : allergies reviewed. [Risks] : Risks [Benefits] : benefits [Alternatives] : alternatives [Consent Obtained] : written consent was obtained prior to the procedure and is detailed in the patient's record [Patient] : the patient [Automated Blood Pressure Cuff] : automated blood pressure cuff [Cardiac Monitor] : cardiac monitor [Pulse Oximeter] : pulse oximeter [Performed By: ___] : Performed by:  MARY [Melvin's] : Melvin's [Biopsy] : biopsy [Erythema] : erythema [Normal] : Normal [Sent to Pathology] : was sent to pathology for analysis [Tolerated Well] : the patient tolerated the procedure well [Vital Signs Stable] : the vital signs were stable [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 No nodularity noted [de-identified] : s/p cold biopsy [de-identified] : s/p cold biopsy [de-identified] : Random gastric; Distal esophagus 38-40 cm

## 2021-02-03 NOTE — PROCEDURE
[Procedure Explained] : The procedure was explained [Allergies Reviewed] : allergies reviewed. [Risks] : Risks [Benefits] : benefits [Alternatives] : alternatives [Consent Obtained] : written consent was obtained prior to the procedure and is detailed in the patient's record [Patient] : the patient [Automated Blood Pressure Cuff] : automated blood pressure cuff [Cardiac Monitor] : cardiac monitor [Pulse Oximeter] : pulse oximeter [Performed By: ___] : Performed by:  MARY [Melvin's] : Melvin's [Biopsy] : biopsy [Erythema] : erythema [Normal] : Normal [Sent to Pathology] : was sent to pathology for analysis [Tolerated Well] : the patient tolerated the procedure well [Vital Signs Stable] : the vital signs were stable [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 No nodularity noted [de-identified] : s/p cold biopsy [de-identified] : s/p cold biopsy [de-identified] : Random gastric; Distal esophagus 38-40 cm

## 2021-02-05 LAB — SURGICAL PATHOLOGY STUDY: SIGNIFICANT CHANGE UP

## 2021-02-12 ENCOUNTER — NON-APPOINTMENT (OUTPATIENT)
Age: 66
End: 2021-02-12

## 2021-06-18 ENCOUNTER — APPOINTMENT (OUTPATIENT)
Dept: PULMONOLOGY | Facility: CLINIC | Age: 66
End: 2021-06-18
Payer: MEDICARE

## 2021-06-18 VITALS — HEART RATE: 73 BPM | OXYGEN SATURATION: 97 %

## 2021-06-18 VITALS — SYSTOLIC BLOOD PRESSURE: 116 MMHG | DIASTOLIC BLOOD PRESSURE: 66 MMHG

## 2021-06-18 PROCEDURE — 94010 BREATHING CAPACITY TEST: CPT

## 2021-06-18 PROCEDURE — 99214 OFFICE O/P EST MOD 30 MIN: CPT | Mod: 25

## 2021-06-18 NOTE — HISTORY OF PRESENT ILLNESS
[Former] : former [None] : No associated symptoms are reported [CPAP] : CPAP [Good Compliance] : good compliance with treatment [Good Tolerance] : good tolerance of treatment [Good Symptom Control] : good symptom control [Follow-Up - Routine Clinic] : a routine clinic follow-up of [Currently Experiencing] : The patient is currently experiencing symptoms. [Dyspnea] : dyspnea [Back Pain] : back pain [Low Calorie Diet] : low calorie diet [Fair Compliance] : fair compliance with treatment [Fair Tolerance] : fair tolerance of treatment [Poor Symptom Control] : poor symptom control [Sleep Apnea] : sleep apnea [High] : high [Low Calorie] : low calorie [Well Balanced Diet] : well balanced meals [Infrequently] : exercises infrequently [Walking] : walking [TextBox_11] : .5 [TextBox_4] : Patient with mild SOBOE but is otherwise without respiratory complaints.\par He is s/p hernia surgery.\par He now follows with neurology for H/A.\par Pt is a former smoker of 1/2 ppd x 8 years, quit 1978\par He presents for evaluation prior to his upcoming back surgery. [TextBox_13] : 8 [YearQuit] : 1978 [de-identified] : at 12 cm of water

## 2021-06-18 NOTE — PHYSICAL EXAM
[General Appearance - Well Developed] : well developed [Normal Appearance] : normal appearance [General Appearance - In No Acute Distress] : no acute distress [Normal Conjunctiva] : the conjunctiva exhibited no abnormalities [Low Lying Soft Palate] : low lying soft palate [Elongated Uvula] : elongated uvula [Enlarged Base of the Tongue] : enlargement of the base of the tongue [III] : III [Neck Appearance] : the appearance of the neck was normal [Heart Rate And Rhythm] : heart rate and rhythm were normal [Murmurs] : no murmurs present [Heart Sounds] : normal S1 and S2 [Edema] : no peripheral edema present [] : no respiratory distress [Respiration, Rhythm And Depth] : normal respiratory rhythm and effort [Auscultation Breath Sounds / Voice Sounds] : lungs were clear to auscultation bilaterally [Exaggerated Use Of Accessory Muscles For Inspiration] : no accessory muscle use [Abdomen Soft] : soft [Abdomen Tenderness] : non-tender [Abnormal Walk] : normal gait [Nail Clubbing] : no clubbing of the fingernails [Cyanosis, Localized] : no localized cyanosis [No Focal Deficits] : no focal deficits [Oriented To Time, Place, And Person] : oriented to person, place, and time [FreeTextEntry1] : No abnormalities

## 2021-06-18 NOTE — PROCEDURE
[FreeTextEntry1] : PFTs performed previously were normal.\par Spirometry performed subsequently was essentially normal\par Spirometry 6/18/21 - normal

## 2021-06-18 NOTE — CONSULT LETTER
[Dear  ___] : Dear  [unfilled], [Consult Letter:] : I had the pleasure of evaluating your patient, [unfilled]. [Please see my note below.] : Please see my note below. [Consult Closing:] : Thank you very much for allowing me to participate in the care of this patient.  If you have any questions, please do not hesitate to contact me. [Sincerely,] : Sincerely, [DrJenifer  ___] : Dr. HERNANDEZ [FreeTextEntry3] : Deng Davis MD, FCCP, D. ABSM\par Pulmonary and Sleep Medicine\par [facility] [place] [DrJenifer ___] : Dr. HERNANDEZ

## 2021-06-18 NOTE — DISCUSSION/SUMMARY
[FreeTextEntry1] : \par #1. CPAP titration study to treat moderate SHELL revealed an optimal pressure of 12 cm of water; he has excellent compliance and is doing well\par #2. Diet and exercise for weight loss\par #3. SOBOE is likely related to weight given normal PFTs and spirometry\par #4. F/u in 3-6 months to reassess response to CPAP with the AirFit N30i\par #5. Neurology f/u for H/A\par #6. Lung function was essentially normal\par #7. The patient does not appear to require BD therapy at this time\par #8. Replace equipment as needed; ordered 12/18/20\par #9. There is no pulmonary contraindication for the patient's upcoming back surgery  I would recommend that CPAP at 12 cm of water should be available to use post-op and with sleep. I would also recommend post op incentive spirometry, GI/DVT prophylaxis as needed, early ambulation as able, and adequate pain control. Would recommend that O2 saturation should be monitored during and after the procedure. \par #10. Pt had both Covid vaccines \par #11. Reviewed risks of exposure and symptoms of Covid-19 virus, including how the virus is spread and precautions to avoid marie virus.\par \par Patient's questions were answered and patient appears to understand these recommendations

## 2021-06-18 NOTE — REVIEW OF SYSTEMS
[Dry Eyes] : dryness of the eyes [Sinus Problems] : sinus problems [Hypertension] : ~T hypertension [Headache] : headache [Depression] : depression [Anxiety] : anxiety [As Noted in HPI] : as noted in HPI [Fever] : no fever [Chills] : no chills [Eye Irritation] : no ~T irritation of the eyes [Epistaxis] : no nosebleeds [Nasal Congestion] : no nasal congestion [Postnasal Drip] : no postnasal drip [Cough] : no cough [Sputum] : not coughing up ~M sputum [Dyspnea] : no dyspnea [Chest Tightness] : no chest tightness [Pleuritic Pain] : no pleuritic pain [Wheezing] : no wheezing [Chest Discomfort] : no chest discomfort [Dysrhythmia] : no dysrhythmia [Murmurs] : no murmurs were heard [Palpitations] : no palpitations [Edema] : ~T edema was not present [Hay Fever] : no hay fever [Itchy Eyes] : no itching of ~T the eyes [Reflux] : no reflux [Nausea] : no nausea [Vomiting] : no vomiting [Constipation] : no constipation [Diarrhea] : no diarrhea [Abdominal Pain] : no abdominal pain [Dysuria] : no dysuria [Trauma] : no ~T physical trauma [Fracture] : no fracture [Anemia] : no anemia [Dizziness] : no dizziness [Syncope] : no fainting [Numbness] : no numbness [Paralysis] : no paralysis was seen [Seizures] : no seizures [Diabetes] : no diabetes mellitus [Thyroid Problem] : no thyroid problem

## 2021-10-29 ENCOUNTER — APPOINTMENT (OUTPATIENT)
Dept: PULMONOLOGY | Facility: CLINIC | Age: 66
End: 2021-10-29
Payer: MEDICARE

## 2021-10-29 VITALS
BODY MASS INDEX: 29.69 KG/M2 | SYSTOLIC BLOOD PRESSURE: 132 MMHG | DIASTOLIC BLOOD PRESSURE: 84 MMHG | OXYGEN SATURATION: 98 % | WEIGHT: 204 LBS | HEART RATE: 72 BPM | RESPIRATION RATE: 16 BRPM

## 2021-10-29 PROCEDURE — 99214 OFFICE O/P EST MOD 30 MIN: CPT

## 2021-10-29 NOTE — DISCUSSION/SUMMARY
[FreeTextEntry1] : \par #1. CPAP titration study to treat moderate SHELL revealed an optimal pressure of 12 cm of water; he has excellent compliance and is doing well\par #2. Diet and exercise for weight loss\par #3. SOBOE is likely related to weight given normal PFTs and spirometry\par #4. F/u in 6 months to assess response to Eson 2 nasal mask given to pt\par #5. Neurology f/u for H/A\par #6. Lung function was essentially normal previously\par #7. The patient does not appear to require BD therapy at this time\par #8. Replace equipment as needed; ordered 10/29/21\par #9. Pt had both Covid vaccines and flu vaccine\par #10. Reviewed risks of exposure and symptoms of Covid-19 virus, including how the virus is spread and precautions to avoid marie virus.\par \par Patient's questions were answered and patient appears to understand these recommendations

## 2021-10-29 NOTE — CONSULT LETTER
[Dear  ___] : Dear  [unfilled], [Consult Letter:] : I had the pleasure of evaluating your patient, [unfilled]. [Please see my note below.] : Please see my note below. [Consult Closing:] : Thank you very much for allowing me to participate in the care of this patient.  If you have any questions, please do not hesitate to contact me. [Sincerely,] : Sincerely, [DrJenifer  ___] : Dr. HERNANDEZ [FreeTextEntry3] : Deng Davis MD, FCCP, D. ABSM\par Pulmonary and Sleep Medicine\par [facility] [place]

## 2021-10-29 NOTE — HISTORY OF PRESENT ILLNESS
[Former] : former [None] : No associated symptoms are reported [CPAP] : CPAP [Good Compliance] : good compliance with treatment [Good Tolerance] : good tolerance of treatment [Good Symptom Control] : good symptom control [Follow-Up - Routine Clinic] : a routine clinic follow-up of [Currently Experiencing] : The patient is currently experiencing symptoms. [Dyspnea] : dyspnea [Back Pain] : back pain [Low Calorie Diet] : low calorie diet [Fair Compliance] : fair compliance with treatment [Fair Tolerance] : fair tolerance of treatment [Poor Symptom Control] : poor symptom control [Sleep Apnea] : sleep apnea [High] : high [Low Calorie] : low calorie [Well Balanced Diet] : well balanced meals [Infrequently] : exercises infrequently [Walking] : walking [TextBox_4] : Patient with mild SOBOE but is otherwise without respiratory complaints.\par He is s/p hernia surgery.\par He now follows with neurology for H/A.\par Pt is a former smoker of 1/2 ppd x 8 years, quit 1978\par Pt is s/p back surgery. [TextBox_11] : .5 [TextBox_13] : 8 [YearQuit] : 1978 [de-identified] : at 12 cm of water

## 2021-11-02 ENCOUNTER — APPOINTMENT (OUTPATIENT)
Dept: PULMONOLOGY | Facility: CLINIC | Age: 66
End: 2021-11-02

## 2021-11-03 ENCOUNTER — APPOINTMENT (OUTPATIENT)
Dept: GASTROENTEROLOGY | Facility: CLINIC | Age: 66
End: 2021-11-03
Payer: MEDICARE

## 2021-11-03 VITALS
SYSTOLIC BLOOD PRESSURE: 160 MMHG | DIASTOLIC BLOOD PRESSURE: 100 MMHG | HEART RATE: 69 BPM | RESPIRATION RATE: 14 BRPM | TEMPERATURE: 97.2 F | OXYGEN SATURATION: 98 % | HEIGHT: 69 IN | WEIGHT: 205 LBS | BODY MASS INDEX: 30.36 KG/M2

## 2021-11-03 DIAGNOSIS — K22.70 BARRETT'S ESOPHAGUS W/OUT DYSPLASIA: ICD-10-CM

## 2021-11-03 PROCEDURE — 99212 OFFICE O/P EST SF 10 MIN: CPT

## 2021-11-03 NOTE — HISTORY OF PRESENT ILLNESS
[de-identified] :  The patient arrived for followup. 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 He also has significant headaches. He has been getting spinal tap, with blood patch.He is complaining of occasional loose stools and has been taking Imodium.His last colonoscopy had just shown diverticulosis.No rectal bleeding. Last EGD was in 02/2020. Last colonoscopy was in 2014 and it had shown diverticulosis.  He has no current symptoms.  The last EGD did not reveal any evidence of Melvin's.  He is on omeprazole 40 mg.

## 2021-11-03 NOTE — ASSESSMENT
[FreeTextEntry1] : At this time, I have recommended to consider tapering down the dose of omeprazole to 20 mg orally daily.  As per the patient he is having labs routinely.  I have recommended a 6 to 12-month follow-up.  Further repeat EGD in 5 years.\par \par Donte Lamar MD\par Gastroenterology \par \par

## 2022-03-07 ENCOUNTER — APPOINTMENT (OUTPATIENT)
Dept: CARDIOLOGY | Facility: CLINIC | Age: 67
End: 2022-03-07

## 2022-03-07 VITALS
HEIGHT: 69 IN | DIASTOLIC BLOOD PRESSURE: 86 MMHG | WEIGHT: 206 LBS | RESPIRATION RATE: 16 BRPM | BODY MASS INDEX: 30.51 KG/M2 | OXYGEN SATURATION: 97 % | HEART RATE: 75 BPM | SYSTOLIC BLOOD PRESSURE: 133 MMHG

## 2022-03-07 DIAGNOSIS — Z82.49 FAMILY HISTORY OF ISCHEMIC HEART DISEASE AND OTHER DISEASES OF THE CIRCULATORY SYSTEM: ICD-10-CM

## 2022-03-07 RX ORDER — LAMOTRIGINE 200 MG/1
200 TABLET ORAL
Refills: 0 | Status: DISCONTINUED | COMMUNITY
End: 2022-03-07

## 2022-03-07 RX ORDER — FENOFIBRATE 160 MG/1
160 TABLET ORAL DAILY
Qty: 90 | Refills: 1 | Status: ACTIVE | COMMUNITY

## 2022-03-07 RX ORDER — ATORVASTATIN CALCIUM 40 MG/1
40 TABLET, FILM COATED ORAL
Qty: 90 | Refills: 1 | Status: ACTIVE | COMMUNITY

## 2022-03-07 RX ORDER — FLUTICASONE PROPIONATE 50 UG/1
50 SPRAY, METERED NASAL
Refills: 0 | Status: ACTIVE | COMMUNITY

## 2022-03-07 RX ORDER — OMEPRAZOLE 20 MG/1
20 CAPSULE, DELAYED RELEASE ORAL
Qty: 30 | Refills: 3 | Status: DISCONTINUED | COMMUNITY
Start: 2018-11-14 | End: 2022-03-07

## 2022-03-07 RX ORDER — ALPRAZOLAM 2 MG/1
TABLET ORAL
Refills: 0 | Status: DISCONTINUED | COMMUNITY
End: 2022-03-07

## 2022-03-07 RX ORDER — ACETAMINOPHEN 500 MG
500 TABLET ORAL
Refills: 0 | Status: DISCONTINUED | COMMUNITY
End: 2022-03-07

## 2022-03-07 RX ORDER — AZELASTINE HYDROCHLORIDE 137 UG/1
0.1 SPRAY, METERED NASAL
Refills: 0 | Status: DISCONTINUED | COMMUNITY
End: 2022-03-07

## 2022-03-07 RX ORDER — MONTELUKAST 10 MG/1
10 TABLET, FILM COATED ORAL DAILY
Qty: 90 | Refills: 3 | Status: ACTIVE | COMMUNITY

## 2022-03-07 RX ORDER — HYDROCODONE/ACETAMINOPHEN 5 MG-500MG
5-500 TABLET ORAL
Refills: 0 | Status: DISCONTINUED | COMMUNITY
End: 2022-03-07

## 2022-03-07 RX ORDER — ATENOLOL 50 MG/1
50 TABLET ORAL DAILY
Qty: 90 | Refills: 0 | Status: ACTIVE | COMMUNITY

## 2022-04-13 RX ORDER — OMEPRAZOLE 40 MG/1
40 CAPSULE, DELAYED RELEASE ORAL
Qty: 90 | Refills: 3 | Status: ACTIVE | COMMUNITY
Start: 2017-12-20 | End: 1900-01-01

## 2022-04-27 ENCOUNTER — APPOINTMENT (OUTPATIENT)
Dept: PULMONOLOGY | Facility: CLINIC | Age: 67
End: 2022-04-27

## 2022-05-18 ENCOUNTER — APPOINTMENT (OUTPATIENT)
Dept: GASTROENTEROLOGY | Facility: CLINIC | Age: 67
End: 2022-05-18
Payer: MEDICARE

## 2022-05-18 VITALS
HEIGHT: 69 IN | BODY MASS INDEX: 30.66 KG/M2 | RESPIRATION RATE: 14 BRPM | OXYGEN SATURATION: 98 % | SYSTOLIC BLOOD PRESSURE: 128 MMHG | DIASTOLIC BLOOD PRESSURE: 82 MMHG | WEIGHT: 207 LBS | HEART RATE: 74 BPM

## 2022-05-18 DIAGNOSIS — Z78.9 OTHER SPECIFIED HEALTH STATUS: ICD-10-CM

## 2022-05-18 DIAGNOSIS — K21.9 GASTRO-ESOPHAGEAL REFLUX DISEASE W/OUT ESOPHAGITIS: ICD-10-CM

## 2022-05-18 PROCEDURE — 99212 OFFICE O/P EST SF 10 MIN: CPT

## 2022-05-18 NOTE — HISTORY OF PRESENT ILLNESS
[de-identified] :  The patient arrived for followup. 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 He also has significant headaches. He has been getting spinal tap, with blood patch.He is complaining of occasional loose stools and has been taking Imodium.His last colonoscopy had just shown diverticulosis.No rectal bleeding. Last EGD was in 02/2020. Last colonoscopy was in 2014 and it had shown diverticulosis. He has no current symptoms. The last EGD did not reveal any evidence of Melvin's. He is on omeprazole 40 mg. \par \par He has been following up with pain management.  He is getting neck injections and has been on medications.  His GI symptoms are under control.

## 2022-05-18 NOTE — ASSESSMENT
[FreeTextEntry1] : He is doing well from reflux point of view.  He is having no GI symptoms.  He is currently on PPI therapy.  He will follow-up with his pain management doctors.  Follow-up with us in 6 months.  Continue PPI.  Will order labs next visit.

## 2023-04-17 ENCOUNTER — APPOINTMENT (OUTPATIENT)
Dept: PULMONOLOGY | Facility: CLINIC | Age: 68
End: 2023-04-17
Payer: MEDICARE

## 2023-04-17 VITALS
SYSTOLIC BLOOD PRESSURE: 126 MMHG | DIASTOLIC BLOOD PRESSURE: 74 MMHG | WEIGHT: 198 LBS | BODY MASS INDEX: 29.33 KG/M2 | HEART RATE: 64 BPM | OXYGEN SATURATION: 97 % | HEIGHT: 69 IN | RESPIRATION RATE: 16 BRPM

## 2023-04-17 PROCEDURE — 99214 OFFICE O/P EST MOD 30 MIN: CPT

## 2023-04-17 RX ORDER — PREGABALIN 150 MG/1
150 CAPSULE ORAL
Qty: 60 | Refills: 0 | Status: ACTIVE | COMMUNITY
Start: 2023-03-16

## 2023-04-17 RX ORDER — TIZANIDINE 4 MG/1
4 TABLET ORAL
Qty: 90 | Refills: 0 | Status: ACTIVE | COMMUNITY
Start: 2023-02-22

## 2023-04-17 RX ORDER — OXYCODONE AND ACETAMINOPHEN 7.5; 325 MG/1; MG/1
7.5-325 TABLET ORAL
Qty: 20 | Refills: 0 | Status: ACTIVE | COMMUNITY
Start: 2023-02-08

## 2023-04-17 RX ORDER — DESVENLAFAXINE 25 MG/1
25 TABLET, EXTENDED RELEASE ORAL
Qty: 14 | Refills: 0 | Status: ACTIVE | COMMUNITY
Start: 2023-02-11

## 2023-04-17 RX ORDER — AMLODIPINE BESYLATE 5 MG/1
5 TABLET ORAL
Qty: 90 | Refills: 0 | Status: ACTIVE | COMMUNITY
Start: 2022-10-16

## 2023-04-17 RX ORDER — TOBRAMYCIN AND DEXAMETHASONE 3; 1 MG/ML; MG/ML
0.3-0.1 SUSPENSION/ DROPS OPHTHALMIC
Qty: 5 | Refills: 0 | Status: ACTIVE | COMMUNITY
Start: 2023-01-14

## 2023-04-17 RX ORDER — OXYCODONE HYDROCHLORIDE AND ACETAMINOPHEN 10; 325 MG/1; MG/1
10-325 TABLET ORAL
Refills: 0 | Status: ACTIVE | COMMUNITY

## 2023-04-17 RX ORDER — VENLAFAXINE 25 MG/1
25 TABLET ORAL
Qty: 60 | Refills: 0 | Status: ACTIVE | COMMUNITY
Start: 2022-11-18

## 2023-04-17 RX ORDER — DESVENLAFAXINE 50 MG/1
50 TABLET, EXTENDED RELEASE ORAL
Qty: 30 | Refills: 0 | Status: ACTIVE | COMMUNITY
Start: 2023-02-25

## 2023-04-17 RX ORDER — RIZATRIPTAN BENZOATE 10 MG/1
10 TABLET, ORALLY DISINTEGRATING ORAL
Qty: 12 | Refills: 0 | Status: ACTIVE | COMMUNITY
Start: 2022-09-13

## 2023-04-17 RX ORDER — METHOCARBAMOL 750 MG/1
750 TABLET, FILM COATED ORAL
Qty: 90 | Refills: 0 | Status: ACTIVE | COMMUNITY
Start: 2023-01-09

## 2023-04-17 RX ORDER — TRAMADOL HYDROCHLORIDE AND ACETAMINOPHEN 37.5; 325 MG/1; MG/1
37.5-325 TABLET, FILM COATED ORAL
Qty: 20 | Refills: 0 | Status: ACTIVE | COMMUNITY
Start: 2022-10-24

## 2023-04-17 RX ORDER — CYCLOBENZAPRINE HYDROCHLORIDE 5 MG/1
5 TABLET, FILM COATED ORAL
Qty: 60 | Refills: 0 | Status: ACTIVE | COMMUNITY
Start: 2022-11-28

## 2023-04-17 RX ORDER — INDOMETHACIN 25 MG/1
25 CAPSULE ORAL
Refills: 0 | Status: ACTIVE | COMMUNITY

## 2023-04-17 RX ORDER — HYDROCORTISONE 25 MG/G
2.5 CREAM TOPICAL
Qty: 28 | Refills: 0 | Status: ACTIVE | COMMUNITY
Start: 2022-11-15

## 2023-04-17 RX ORDER — FENOFIBRATE 200 MG/1
200 CAPSULE ORAL
Qty: 90 | Refills: 0 | Status: ACTIVE | COMMUNITY
Start: 2023-04-09

## 2023-04-17 RX ORDER — DESVENLAFAXINE 100 MG/1
100 TABLET, EXTENDED RELEASE ORAL
Qty: 30 | Refills: 0 | Status: ACTIVE | COMMUNITY
Start: 2023-03-25

## 2023-04-17 RX ORDER — FLUOROMETHOLONE 1 MG/ML
0.1 SOLUTION/ DROPS OPHTHALMIC
Qty: 5 | Refills: 0 | Status: ACTIVE | COMMUNITY
Start: 2023-04-14

## 2023-04-17 RX ORDER — OXYCODONE 5 MG/1
5 TABLET ORAL
Qty: 14 | Refills: 0 | Status: ACTIVE | COMMUNITY
Start: 2022-12-21

## 2023-04-17 RX ORDER — CLONAZEPAM 1 MG/1
1 TABLET ORAL 3 TIMES DAILY
Refills: 0 | Status: DISCONTINUED | COMMUNITY
End: 2023-04-17

## 2023-04-17 RX ORDER — OFLOXACIN 3 MG/ML
0.3 SOLUTION/ DROPS OPHTHALMIC
Qty: 10 | Refills: 0 | Status: ACTIVE | COMMUNITY
Start: 2023-02-04

## 2023-04-17 RX ORDER — RIZATRIPTAN BENZOATE 10 MG/1
10 TABLET ORAL
Refills: 0 | Status: DISCONTINUED | COMMUNITY
End: 2023-04-17

## 2023-04-17 RX ORDER — LIDOCAINE HYDROCHLORIDE 20 MG/ML
2 JELLY TOPICAL
Qty: 5 | Refills: 0 | Status: ACTIVE | COMMUNITY
Start: 2022-12-15

## 2023-04-17 RX ORDER — TADALAFIL 5 MG/1
5 TABLET ORAL
Qty: 30 | Refills: 0 | Status: ACTIVE | COMMUNITY
Start: 2022-07-26

## 2023-04-17 RX ORDER — AMLODIPINE BESYLATE 10 MG/1
10 TABLET ORAL
Qty: 90 | Refills: 0 | Status: ACTIVE | COMMUNITY
Start: 2022-10-24

## 2023-04-17 RX ORDER — BENZOYL PEROXIDE 50 MG/ML
5 GEL TOPICAL
Qty: 42 | Refills: 0 | Status: ACTIVE | COMMUNITY
Start: 2023-02-28

## 2023-04-17 RX ORDER — PROPRANOLOL HYDROCHLORIDE 20 MG/1
20 TABLET ORAL
Qty: 30 | Refills: 0 | Status: ACTIVE | COMMUNITY
Start: 2023-03-25

## 2023-04-17 RX ORDER — PREGABALIN 300 MG/1
300 CAPSULE ORAL
Refills: 0 | Status: ACTIVE | COMMUNITY

## 2023-04-17 RX ORDER — PROPRANOLOL HYDROCHLORIDE 10 MG/1
10 TABLET ORAL
Qty: 30 | Refills: 0 | Status: ACTIVE | COMMUNITY
Start: 2023-02-11

## 2023-04-17 RX ORDER — METRONIDAZOLE 7.5 MG/G
0.75 CREAM TOPICAL
Qty: 45 | Refills: 0 | Status: ACTIVE | COMMUNITY
Start: 2023-04-11

## 2023-04-17 RX ORDER — KETOCONAZOLE 20 MG/G
2 CREAM TOPICAL
Qty: 30 | Refills: 0 | Status: ACTIVE | COMMUNITY
Start: 2022-10-17

## 2023-04-17 RX ORDER — NEOMYCIN AND POLYMYXIN B SULFATES AND DEXAMETHASONE 3.5; 10000; 1 MG/G; [IU]/G; MG/G
3.5-10000-0.1 OINTMENT OPHTHALMIC
Qty: 4 | Refills: 0 | Status: ACTIVE | COMMUNITY
Start: 2023-02-04

## 2023-04-17 NOTE — HISTORY OF PRESENT ILLNESS
[None] : No associated symptoms are reported [CPAP] : CPAP [Good Compliance] : good compliance with treatment [Good Tolerance] : good tolerance of treatment [Good Symptom Control] : good symptom control [Follow-Up - Routine Clinic] : a routine clinic follow-up of [Currently Experiencing] : The patient is currently experiencing symptoms. [Dyspnea] : dyspnea [Back Pain] : back pain [Low Calorie Diet] : low calorie diet [Fair Compliance] : fair compliance with treatment [Fair Tolerance] : fair tolerance of treatment [Poor Symptom Control] : poor symptom control [Sleep Apnea] : sleep apnea [High] : high [Low Calorie] : low calorie [Well Balanced Diet] : well balanced meals [Infrequently] : exercises infrequently [Walking] : walking [TextBox_4] : Patient with mild SOBOE but is otherwise without respiratory complaints.\par He is s/p hernia surgery.\par He now follows with neurology for H/A.\par Pt is a former smoker of 1/2 ppd x 8 years, quit 1978\par Pt is s/p back surgery and recently had pain stimulator with an implant in R temporal region which now interferes with his PAP use so we could like to try oral appliance therapy. [de-identified] : at 12 cm of water

## 2023-04-17 NOTE — DISCUSSION/SUMMARY
[FreeTextEntry1] : \par #1. CPAP titration study to treat moderate SHELL revealed an optimal pressure of 12 cm of water; he had excellent compliance and was doing well but with recent scalp surgery and now straps of mask are irritating the suture line so has difficulty using this therapy; he would now like to try oral appliance therapy instead so will refer for dental evaluation\par #2. Diet and exercise for weight loss\par #3. SOBOE is likely related to weight given normal PFTs and spirometry\par #4. Repeat HST to re-evaluate severity of SHELL prior to dental evaluation for oral appliance therapy\par #5. CXR given rales on exam\par #6. Lung function was essentially normal previously; repeat given rales\par #7. The patient does not appear to require BD therapy at this time\par #8. Replace equipment as needed\par #9. Pt had both Covid vaccines and flu vaccine\par #10. F/u in 4-6 weeks with HST, CXR, and PFTs\par #11. Neurology f/u for H/A\par #12. Reviewed risks of exposure and symptoms of Covid-19 virus, including how the virus is spread and precautions to avoid marie virus.\par \par The patient expressed understanding and agreement with the above recommendations/plan and accepts responsibility to be compliant with recommended testing, therapies, and f/u visits.\par All relevant questions and concerns were addressed.

## 2023-04-17 NOTE — PHYSICAL EXAM
[No Acute Distress] : no acute distress [Normal Appearance] : normal appearance [Supple] : supple [Normal Rate/Rhythm] : normal rate/rhythm [Normal S1, S2] : normal s1, s2 [No Murmurs] : no murmurs [No Resp Distress] : no resp distress [No Acc Muscle Use] : no acc muscle use [Normal Rhythm and Effort] : normal rhythm and effort [Rales] : rales [No Abnormalities] : no abnormalities [Benign] : benign [Not Tender] : not tender [Soft] : soft [No Clubbing] : no clubbing [No Edema] : no edema [No Focal Deficits] : no focal deficits [Oriented x3] : oriented x3

## 2023-04-17 NOTE — REVIEW OF SYSTEMS
[Nasal Congestion] : nasal congestion [Postnasal Drip] : postnasal drip [Seasonal Allergies] : seasonal allergies [Back Pain] : back pain [Headache] : headache [Depression] : depression [Anxiety] : anxiety [Negative] : Hematologic

## 2023-04-17 NOTE — CONSULT LETTER
[Dear  ___] : Dear  [unfilled], [Consult Letter:] : I had the pleasure of evaluating your patient, [unfilled]. [Please see my note below.] : Please see my note below. [Consult Closing:] : Thank you very much for allowing me to participate in the care of this patient.  If you have any questions, please do not hesitate to contact me. [Sincerely,] : Sincerely, [DrJenifer  ___] : Dr. HERNANDEZ [FreeTextEntry3] : Deng Davis MD, FCCP, D. ABSM\par Pulmonary and Sleep Medicine\par [facility] at [place] [DrJenifer ___] : Dr. HERNANDEZ

## 2023-04-25 ENCOUNTER — APPOINTMENT (OUTPATIENT)
Dept: PULMONOLOGY | Facility: CLINIC | Age: 68
End: 2023-04-25

## 2023-05-23 ENCOUNTER — APPOINTMENT (OUTPATIENT)
Dept: PULMONOLOGY | Facility: CLINIC | Age: 68
End: 2023-05-23

## 2023-07-26 ENCOUNTER — APPOINTMENT (OUTPATIENT)
Dept: PAIN MANAGEMENT | Facility: CLINIC | Age: 68
End: 2023-07-26
Payer: MEDICARE

## 2023-07-26 VITALS
DIASTOLIC BLOOD PRESSURE: 76 MMHG | SYSTOLIC BLOOD PRESSURE: 123 MMHG | WEIGHT: 202 LBS | HEIGHT: 69 IN | BODY MASS INDEX: 29.92 KG/M2 | HEART RATE: 69 BPM

## 2023-07-26 PROCEDURE — 99205 OFFICE O/P NEW HI 60 MIN: CPT

## 2023-07-26 NOTE — PHYSICAL EXAM
[FreeTextEntry1] : Constitutional: No signs of distress or signs of toxicity. No head tilt.\par Mental Status: Alert and well oriented. Speech fluent. No aphasia. Fund of knowledge intact. \par Psychiatric: Mood stable.\par Cranial Nerve: PERRLA: No papilledema; No VFC: No Ximena. V1-3 intact. No facial asymmetry, hearing grossly intact; palate elevates symmetrically, tongue midline\par Motor:No involuntary movements noted.  Adequate bulk, tone throughout, 5/5 strength of all muscle groups \par DTR: present and symmetrical; no clonus, plantars  downgoing\par Sensory: intact to primary and secondary modalities; neg Romberg\par Cerebellar: adequate finger to nose and heel to shin bilaterally.\par Gait: non antalgic or ataxic.\par Eyes: no redness or swelling\par HEENT: intact; no signs of trauma.\par Neck: No masses noted\par Pulmonary: no respiratory distress\par Vascular: no temperature, color change or sudomotor changes.; no edema\par Musculoskeletal: examination of the cervical spine reveals no midline tenderness, range of motion full upon flexion, extension and lateral rotation. Negative facet tenderness, Negative Spurlings bilaterally. examination of the lumbar spine reveals no midline or paraspinal tenderness; Range of motion full upon flexion, extension and lateral rotation; negative facet loading, No tenderness of sciatic notch, No tenderness of bilateral greater trochanters, Negative KLEBER, negative SLRT bilaterally,\par Abd: non tender\par Skin: No rash.\par

## 2023-07-26 NOTE — HISTORY OF PRESENT ILLNESS
[FreeTextEntry1] : This is a case of a  68  -year-old left-handed male with a past medical history of sleep apnea,HTN, Elevated cholesterol, gout,    who presents with a chief complaint of HEADACHES. \par \par Headaches began about 5 years ago, diagnosed as cervical headaches described as a "ping pong" on the right side of head but can spread to the left side triggered by stress. The pain is a constant non throbbing pressure not associated symptoms: Nausea, vomiting, photophobia, phonophobia, or aura. He notes his head tilts to the right.  \par Patient feels "my head is a second or two behind my body. He also notes having difficulty smiling or laughing because it triggers right head pain. Due to this, patient avoids social events. Went to see psychiatrist who feels it is not related to depression.\par Three or 4 months ago, placed a neurostimulator for his presenting pain. Notes minimal relief. BOTOX failed to provide relief. x3. Blood patch failed to provide relief. Occipital nerve blocks without relief. \par He was seen by Dr. Shook who thinks he has a /movement disorder of the neck". \par Emgality, Aimovig, Vyepti failed to provide relief.\par \par \par Currently Lyrica 200 mg tid; Seroquel 150 mgs qd; Trintellix 5 mgs; Propanolol 20 mgs qd. Percocet 10/325 mgs tid.ES Tylenol 2 qd daily. \par  \par Imaging: None available review\par Medications: None \par Interventions: None\par CAM therapies: None\par SHX: Retired ADLs; No tobacco Occ cannabis - no relief. \par Family history: Noncontributory\par Allergies: NKA\par \par

## 2023-07-26 NOTE — ASSESSMENT
[FreeTextEntry1] : Chronic headaches...non migraine. By history, patient may have a form of cervical dystonia\par Patient has had multiple treatments without relief but notes only Percocet helps to decrease the pain. \par I am not sure as tot he etiology of the pain.\par I attempted to contact his neurologist but unable to leave a message. Patient advised to have his neurologist and pain doctor to contact me to discuss plan. Patient to provide me imaging studies, EMGs

## 2023-08-08 ENCOUNTER — APPOINTMENT (OUTPATIENT)
Dept: ORTHOPEDIC SURGERY | Facility: CLINIC | Age: 68
End: 2023-08-08
Payer: MEDICARE

## 2023-08-08 VITALS
HEART RATE: 62 BPM | SYSTOLIC BLOOD PRESSURE: 143 MMHG | BODY MASS INDEX: 27.92 KG/M2 | WEIGHT: 195 LBS | DIASTOLIC BLOOD PRESSURE: 83 MMHG | HEIGHT: 70 IN | TEMPERATURE: 97.9 F

## 2023-08-08 PROCEDURE — 73110 X-RAY EXAM OF WRIST: CPT | Mod: 50

## 2023-08-08 PROCEDURE — 20605 DRAIN/INJ JOINT/BURSA W/O US: CPT | Mod: LT

## 2023-08-08 PROCEDURE — 20526 THER INJECTION CARP TUNNEL: CPT | Mod: 50,59

## 2023-08-08 PROCEDURE — 99204 OFFICE O/P NEW MOD 45 MIN: CPT | Mod: 25

## 2023-08-08 NOTE — PHYSICAL EXAM
[de-identified] : Examination of the [bilateral] wrist reveals discomfort with compression at the level of the volar carpal tunnel eliciting numbness/tingling throughout the fingertips Examination of the left wrist reveals significant effusion with crepitus upon motion.  .  There is tenderness at the radiocarpal joint as well with a palpable effusion.   There is pain with Aaron shift testing. P/S is painless.   [de-identified] : [4] views of [bilateral hands and wrists] were obtained today in my office and were seen by me and discussed with the patient.  These [show findings consistent with bilateral basal joint OA and findings of IP joint OA] He has bilateral scapholunate widening bilaterally and arthrosis at the capitate lunate joint bilaterally

## 2023-08-08 NOTE — HISTORY OF PRESENT ILLNESS
[FreeTextEntry1] : Chad is a pleasant 68-year-old male who unfortunately today presents with a chronic multiple year history of worsening numbness in bilateral hands worse on the left.  He feels as though he has lost strength and is dropping objects and is having issues with dexterity Recently while playing golf about a month prior to today's visit he hit the ball and subsequently suffered worsening left wrist pain swelling and discomfort. He admits to a history of gout

## 2023-08-08 NOTE — CONSULT LETTER
[Dear  ___] : Dear  [unfilled], [Consult Letter:] : I had the pleasure of evaluating your patient, [unfilled]. [Please see my note below.] : Please see my note below. [Consult Closing:] : Thank you very much for allowing me to participate in the care of this patient.  If you have any questions, please do not hesitate to contact me. [Sincerely,] : Sincerely, [FreeTextEntry2] : Benjy Chu,  [address] [facility] [place] [address] [FreeTextEntry3] : Jin Vicente MD

## 2023-08-08 NOTE — ASSESSMENT
[FreeTextEntry1] : ASSESSMENT: The patient comes in today with multiple chronic exacerbated symptoms including bilateral wrist scapholunate related arthropathy.  He does have a history of gout and imaging is supportive of this history as well.  He is also suffering from chronic carpal tunnel symptoms.  He does wear a brace which does help.  At this point I do recommend further nonoperative management in form of injection.  If symptoms continue to worsen upon follow-up we may consider surgery  The patient was adequately and thoroughly informed of my assessment of their current condition(s).  - This may diminish bodily function for the extremity. We discussed prognosis, treatment modalities including operative and nonoperative options for the above diagnostic assessment. [For this I was able to review other physicians note(s) including reviewing other tests, imaging results as well as personally view these results for my own interpretation.]  Injection:  The risks and benefits of a steroid injection were discussed in detail. The risks include but are not limited to: pain, infection, swelling, flare response, bleeding, subcutaneous fat atrophy, skin depigmentation and/or elevation of blood sugar. The risk of incomplete resolution of symptoms, recurrence and additional intervention was reviewed and considered by the patient.  The patient agreed to proceed and under a sterile prep, I injected 1 unit into 1 cc of a combination of Celestone and Lidocaine into the right carpal tunnel, left carpal tunnel, left wrist joint. The patient tolerated the injection well.  The patient was adequately and thoroughly informed of my assessment of their current condition(s).   DISCUSSION: 1.  I am hopeful that injections to bilateral carpal tunnels help with his symptoms.  I am also hopeful that the injection for the left wrist joint will help relieve his scapholunate related arthropathy.  He has a history of gout 2.  He will continue bracing and we will see each other again in 2 months

## 2023-08-31 ENCOUNTER — APPOINTMENT (OUTPATIENT)
Dept: ORTHOPEDIC SURGERY | Facility: CLINIC | Age: 68
End: 2023-08-31
Payer: MEDICARE

## 2023-08-31 VITALS
BODY MASS INDEX: 27.92 KG/M2 | DIASTOLIC BLOOD PRESSURE: 75 MMHG | HEART RATE: 61 BPM | SYSTOLIC BLOOD PRESSURE: 117 MMHG | WEIGHT: 195 LBS | HEIGHT: 70 IN

## 2023-08-31 PROCEDURE — 20550 NJX 1 TENDON SHEATH/LIGAMENT: CPT | Mod: LT

## 2023-08-31 PROCEDURE — 20526 THER INJECTION CARP TUNNEL: CPT | Mod: 50,59

## 2023-08-31 PROCEDURE — 99214 OFFICE O/P EST MOD 30 MIN: CPT | Mod: 25

## 2023-08-31 NOTE — ASSESSMENT
[FreeTextEntry1] : ASSESSMENT: The patient comes in today with known multiple chronic exacerbated symptoms including carpal tunnel disease bilaterally.  He would like repeat injections today.  He is also suffering from left thumb tendinitis.  He would like an injection for this as well.  Furthermore, he does have radiographic and history evidence of wrist arthropathy bilaterally.  At this stage I also recommend continued bracing as needed which he has been doing   The patient was adequately and thoroughly informed of my assessment of their current condition(s).  - This may diminish bodily function for the extremity. We discussed prognosis, treatment modalities including operative and nonoperative options for the above diagnostic assessment. For this, when accessible, I was able to review other physicians note(s) including reviewing other tests, imaging results as well as personally view these results for my own interpretation.   Injection Injection:  The risks and benefits of a steroid injection were discussed in detail. The risks include but are not limited to: pain, infection, swelling, flare response, bleeding, subcutaneous fat atrophy, skin depigmentation and/or elevation of blood sugar. The risk of incomplete resolution of symptoms, recurrence and additional intervention was reviewed and considered by the patient.  The patient agreed to proceed and under a sterile prep, I injected 1 unit into 1 cc of a combination of Celestone and Lidocaine into the right carpal tunnel, left carpal tunnel, left thumb A1. The patient tolerated the injection well. The patient was adequately and thoroughly informed of my assessment of their current condition(s).  DISCUSSION: 1.  As requested repeat carpal tunnel injections and left thumb trigger injection.  Bracing as needed 2.  Return to follow-up 2 months he would like to proceed with left-sided carpal tunnel if need be

## 2023-08-31 NOTE — HISTORY OF PRESENT ILLNESS
[FreeTextEntry1] : Chad returns for follow-up.  At this point in time he does tell me that he can no longer bear his bilateral carpal tunnel symptoms.  He continues to drop objects.  His symptoms are quite severe day and night.  He also complains of severe left thumb pain and stiffness.

## 2023-08-31 NOTE — PHYSICAL EXAM
[de-identified] : Examination of the [bilateral] wrist reveals discomfort with compression at the level of the volar carpal tunnel eliciting numbness/tingling throughout the fingertips Examination of the left wrist reveals significant effusion with crepitus upon motion.  .  There is tenderness at the radiocarpal joint as well with a palpable effusion.   There is pain with Aaron shift testing. P/S is painless.  Examination of the hand(s)  particularly at the A1 of the left thumb reveals tenderness with a palpable click.  [de-identified] : [4] views of [bilateral hands and wrists] were seen by me and discussed with the patient.  These [show findings consistent with bilateral basal joint OA and findings of IP joint OA] He has bilateral scapholunate widening bilaterally and arthrosis at the metacarpal joint bilaterally

## 2023-10-20 ENCOUNTER — APPOINTMENT (OUTPATIENT)
Dept: NEUROLOGY | Facility: CLINIC | Age: 68
End: 2023-10-20
Payer: MEDICARE

## 2023-10-20 VITALS
BODY MASS INDEX: 27.92 KG/M2 | SYSTOLIC BLOOD PRESSURE: 135 MMHG | WEIGHT: 195 LBS | HEIGHT: 70 IN | HEART RATE: 73 BPM | DIASTOLIC BLOOD PRESSURE: 83 MMHG

## 2023-10-20 PROCEDURE — 99215 OFFICE O/P EST HI 40 MIN: CPT

## 2023-11-10 ENCOUNTER — APPOINTMENT (OUTPATIENT)
Dept: ORTHOPEDIC SURGERY | Facility: CLINIC | Age: 68
End: 2023-11-10
Payer: MEDICARE

## 2023-11-10 VITALS
HEART RATE: 58 BPM | SYSTOLIC BLOOD PRESSURE: 103 MMHG | WEIGHT: 195 LBS | DIASTOLIC BLOOD PRESSURE: 68 MMHG | BODY MASS INDEX: 27.92 KG/M2 | HEIGHT: 70 IN

## 2023-11-10 DIAGNOSIS — M35.00 SICCA SYNDROME, UNSPECIFIED: ICD-10-CM

## 2023-11-10 DIAGNOSIS — M65.30 TRIGGER FINGER, UNSPECIFIED FINGER: ICD-10-CM

## 2023-11-10 PROCEDURE — 99214 OFFICE O/P EST MOD 30 MIN: CPT

## 2023-12-05 ENCOUNTER — APPOINTMENT (OUTPATIENT)
Dept: PULMONOLOGY | Facility: CLINIC | Age: 68
End: 2023-12-05
Payer: MEDICARE

## 2023-12-05 VITALS — BODY MASS INDEX: 30.26 KG/M2 | HEIGHT: 68.5 IN | WEIGHT: 202 LBS

## 2023-12-05 PROCEDURE — 85018 HEMOGLOBIN: CPT | Mod: QW

## 2023-12-05 PROCEDURE — 94727 GAS DIL/WSHOT DETER LNG VOL: CPT

## 2023-12-05 PROCEDURE — 94010 BREATHING CAPACITY TEST: CPT

## 2023-12-05 PROCEDURE — 94729 DIFFUSING CAPACITY: CPT

## 2023-12-11 ENCOUNTER — NON-APPOINTMENT (OUTPATIENT)
Age: 68
End: 2023-12-11

## 2023-12-11 ENCOUNTER — APPOINTMENT (OUTPATIENT)
Dept: PULMONOLOGY | Facility: CLINIC | Age: 68
End: 2023-12-11
Payer: MEDICARE

## 2023-12-11 VITALS
SYSTOLIC BLOOD PRESSURE: 102 MMHG | DIASTOLIC BLOOD PRESSURE: 60 MMHG | OXYGEN SATURATION: 95 % | RESPIRATION RATE: 16 BRPM | HEART RATE: 63 BPM | BODY MASS INDEX: 30.27 KG/M2 | WEIGHT: 202 LBS

## 2023-12-11 DIAGNOSIS — R09.89 OTHER SPECIFIED SYMPTOMS AND SIGNS INVOLVING THE CIRCULATORY AND RESPIRATORY SYSTEMS: ICD-10-CM

## 2023-12-11 DIAGNOSIS — Z01.89 ENCOUNTER FOR OTHER SPECIFIED SPECIAL EXAMINATIONS: ICD-10-CM

## 2023-12-11 PROCEDURE — 99214 OFFICE O/P EST MOD 30 MIN: CPT

## 2023-12-11 RX ORDER — BACLOFEN 10 MG/1
10 TABLET ORAL
Qty: 90 | Refills: 0 | Status: COMPLETED | COMMUNITY
Start: 2023-01-17 | End: 2023-12-11

## 2023-12-11 RX ORDER — DOXYCYCLINE 100 MG/1
100 CAPSULE ORAL
Qty: 14 | Refills: 0 | Status: COMPLETED | COMMUNITY
Start: 2022-10-30 | End: 2023-12-11

## 2023-12-11 RX ORDER — CHLORZOXAZONE 500 MG/1
500 TABLET ORAL
Qty: 120 | Refills: 0 | Status: COMPLETED | COMMUNITY
Start: 2022-10-27 | End: 2023-12-11

## 2023-12-11 RX ORDER — DOXYCYCLINE 50 MG/1
50 CAPSULE ORAL
Qty: 30 | Refills: 0 | Status: COMPLETED | COMMUNITY
Start: 2023-02-28 | End: 2023-12-11

## 2023-12-11 RX ORDER — CEPHALEXIN 500 MG/1
500 CAPSULE ORAL
Qty: 40 | Refills: 0 | Status: COMPLETED | COMMUNITY
Start: 2023-03-28 | End: 2023-12-11

## 2023-12-11 RX ORDER — CEFADROXIL 500 MG/1
500 CAPSULE ORAL
Qty: 20 | Refills: 0 | Status: COMPLETED | COMMUNITY
Start: 2022-11-02 | End: 2023-12-11

## 2023-12-11 RX ORDER — CLINDAMYCIN PHOSPHATE 10 MG/ML
1 LOTION TOPICAL
Qty: 60 | Refills: 0 | Status: COMPLETED | COMMUNITY
Start: 2023-02-28 | End: 2023-12-11

## 2023-12-11 RX ORDER — ZOLPIDEM TARTRATE 10 MG/1
10 TABLET, FILM COATED ORAL
Refills: 0 | Status: COMPLETED | COMMUNITY
End: 2023-12-11

## 2023-12-11 RX ORDER — HYDROCODONE BITARTRATE AND ACETAMINOPHEN 10; 325 MG/1; MG/1
10-325 TABLET ORAL
Qty: 12 | Refills: 0 | Status: COMPLETED | COMMUNITY
Start: 2022-10-31 | End: 2023-12-11

## 2024-01-12 ENCOUNTER — APPOINTMENT (OUTPATIENT)
Dept: ORTHOPEDIC SURGERY | Facility: CLINIC | Age: 69
End: 2024-01-12
Payer: MEDICARE

## 2024-01-12 VITALS
SYSTOLIC BLOOD PRESSURE: 108 MMHG | WEIGHT: 195 LBS | BODY MASS INDEX: 29.21 KG/M2 | DIASTOLIC BLOOD PRESSURE: 73 MMHG | HEIGHT: 68.5 IN | HEART RATE: 58 BPM

## 2024-01-12 PROCEDURE — 99214 OFFICE O/P EST MOD 30 MIN: CPT

## 2024-01-12 NOTE — ASSESSMENT
[FreeTextEntry1] : ASSESSMENT: The patient comes in today with known multiple chronic exacerbated symptoms including carpal tunnel disease bilaterally.  He is suffering from tendinopathy in multiple sites as well as wrist arthropathy.  At this point he would like to commence with tendon releases and carpal tunnel release on the left side.  Furthermore, he does have radiographic and history evidence of wrist arthropathy bilaterally. At this stage I also recommend continued bracing as needed which he has been doing  The patient was adequately and thoroughly informed of my assessment of their current condition(s). - This may diminish bodily function for the extremity. We discussed prognosis, treatment modalities including operative and nonoperative options for the above diagnostic assessment. For this, when accessible, I was able to review other physicians note(s) including reviewing other tests, imaging results as well as personally view these results for my own interpretation.  The patient was adequately and thoroughly informed of my assessment of their current condition(s).  The patient has significant findings consistent with carpal tunnel syndrome. We discussed nerve study findings and prognosis of this condition.  I told them that surgery would be of significant benefit for their acute painful symptoms, however the efficacy of surgery for sensory and motor recovery will be determined only with time.  These might not improve at all. With this in mind they elected to proceed with a carpal tunnel release.  Surgical Consent Discussion:  I explained the risks and benefits of surgical intervention to the patient. The risks include, but are not limited to: pain, infection, swelling, stiffness, injury to underlying neurovascular structures, scar sensitivity, incomplete resolution of symptoms, the possibility of the need for future surgery and finally the need to comply with a post-operative occupational therapy protocol. She understands, agrees and informed consent was obtained. The patients surgery will be scheduled in the near future. The patient complains of recurrent symptoms. We discussed treatment options. Shared decision making was undertaken. I recommend that the patient undergo release of the 1st dorsal compartment. The risks benefits and alternatives were discussed with the patient. This included, post limited to nerve injury, anesthetic block injury, instability of the tendons, persistent pain, infection, CRPS, need for additional surgery, scar sensitivity, etc.  Surgical Consent  The patient has a diagnosis of trigger finger tenosynovitis.  The risks benefits alternatives were discussed with the patient. Shared decision-making was undertaken. The risks discussed included, but were not limited to pain, stiffness, PIP contracture, recurrence, pulley injury, development of Dupuytren's disease, CRPS, need for additional surgery, infection, nerve injury, tissue loss and the standard risks of both anesthesia and surgery etc  DISCUSSION: 1.  He elects to proceed with left carpal tunnel release, left de Quervain's release, left trigger thumb release

## 2024-01-12 NOTE — PHYSICAL EXAM
[de-identified] : Examination of the [bilateral] wrist reveals discomfort with compression at the level of the volar carpal tunnel eliciting numbness/tingling throughout the fingertips Examination of the left wrist reveals significant effusion with crepitus upon motion.  .  There is tenderness at the radiocarpal joint as well with a palpable effusion.   There is pain with Aaron shift testing. P/S is painless. Examination of the [left] wrist reveals tenderness at the level of the first dorsal compartment with a positive finkelstein's examination Examination of the hand(s)  particularly at the A1 of the left thumb reveals tenderness with a palpable click.  [de-identified] : [4] views of [bilateral hands and wrists] were reviewed by me and discussed with the patient.  These [show findings consistent with bilateral basal joint OA and findings of IP joint OA] He has bilateral scapholunate widening bilaterally and arthrosis at the metacarpal joint bilaterally

## 2024-01-12 NOTE — HISTORY OF PRESENT ILLNESS
[FreeTextEntry1] : Chad returns for follow-up.  At this point in time he does tell me that he can no longer bear his bilateral carpal tunnel symptoms.  He continues to drop objects.  His symptoms are quite severe day and night.  He would like to proceed with surgery.

## 2024-01-15 ENCOUNTER — APPOINTMENT (OUTPATIENT)
Dept: PULMONOLOGY | Facility: CLINIC | Age: 69
End: 2024-01-15
Payer: MEDICARE

## 2024-01-15 VITALS
HEART RATE: 58 BPM | RESPIRATION RATE: 16 BRPM | OXYGEN SATURATION: 95 % | SYSTOLIC BLOOD PRESSURE: 116 MMHG | DIASTOLIC BLOOD PRESSURE: 62 MMHG

## 2024-01-15 DIAGNOSIS — G47.10 HYPERSOMNIA, UNSPECIFIED: ICD-10-CM

## 2024-01-15 DIAGNOSIS — R06.02 SHORTNESS OF BREATH: ICD-10-CM

## 2024-01-15 DIAGNOSIS — R06.83 SNORING: ICD-10-CM

## 2024-01-15 DIAGNOSIS — E66.3 OVERWEIGHT: ICD-10-CM

## 2024-01-15 DIAGNOSIS — R09.89 OTHER SPECIFIED SYMPTOMS AND SIGNS INVOLVING THE CIRCULATORY AND RESPIRATORY SYSTEMS: ICD-10-CM

## 2024-01-15 DIAGNOSIS — Z87.891 PERSONAL HISTORY OF NICOTINE DEPENDENCE: ICD-10-CM

## 2024-01-15 DIAGNOSIS — G47.33 OBSTRUCTIVE SLEEP APNEA (ADULT) (PEDIATRIC): ICD-10-CM

## 2024-01-15 PROCEDURE — 99214 OFFICE O/P EST MOD 30 MIN: CPT

## 2024-01-23 ENCOUNTER — OUTPATIENT (OUTPATIENT)
Dept: OUTPATIENT SERVICES | Facility: HOSPITAL | Age: 69
LOS: 1 days | End: 2024-01-23
Payer: MEDICARE

## 2024-01-23 DIAGNOSIS — Z01.818 ENCOUNTER FOR OTHER PREPROCEDURAL EXAMINATION: ICD-10-CM

## 2024-01-23 LAB
A1C WITH ESTIMATED AVERAGE GLUCOSE RESULT: 4.9 % — SIGNIFICANT CHANGE UP (ref 4–5.6)
ANION GAP SERPL CALC-SCNC: 13 MMOL/L — SIGNIFICANT CHANGE UP (ref 5–17)
BASOPHILS # BLD AUTO: 0.02 K/UL — SIGNIFICANT CHANGE UP (ref 0–0.2)
BASOPHILS NFR BLD AUTO: 0.3 % — SIGNIFICANT CHANGE UP (ref 0–2)
BUN SERPL-MCNC: 29.6 MG/DL — HIGH (ref 8–20)
CALCIUM SERPL-MCNC: 9.9 MG/DL — SIGNIFICANT CHANGE UP (ref 8.4–10.5)
CHLORIDE SERPL-SCNC: 100 MMOL/L — SIGNIFICANT CHANGE UP (ref 96–108)
CO2 SERPL-SCNC: 23 MMOL/L — SIGNIFICANT CHANGE UP (ref 22–29)
CREAT SERPL-MCNC: 1.38 MG/DL — HIGH (ref 0.5–1.3)
EGFR: 56 ML/MIN/1.73M2 — LOW
EOSINOPHIL # BLD AUTO: 0.16 K/UL — SIGNIFICANT CHANGE UP (ref 0–0.5)
EOSINOPHIL NFR BLD AUTO: 2.1 % — SIGNIFICANT CHANGE UP (ref 0–6)
ESTIMATED AVERAGE GLUCOSE: 94 MG/DL — SIGNIFICANT CHANGE UP (ref 68–114)
GLUCOSE SERPL-MCNC: 78 MG/DL — SIGNIFICANT CHANGE UP (ref 70–99)
HCT VFR BLD CALC: 41.3 % — SIGNIFICANT CHANGE UP (ref 39–50)
HGB BLD-MCNC: 13.3 G/DL — SIGNIFICANT CHANGE UP (ref 13–17)
IMM GRANULOCYTES NFR BLD AUTO: 0.4 % — SIGNIFICANT CHANGE UP (ref 0–0.9)
LYMPHOCYTES # BLD AUTO: 1.83 K/UL — SIGNIFICANT CHANGE UP (ref 1–3.3)
LYMPHOCYTES # BLD AUTO: 24.4 % — SIGNIFICANT CHANGE UP (ref 13–44)
MCHC RBC-ENTMCNC: 31.5 PG — SIGNIFICANT CHANGE UP (ref 27–34)
MCHC RBC-ENTMCNC: 32.2 GM/DL — SIGNIFICANT CHANGE UP (ref 32–36)
MCV RBC AUTO: 97.9 FL — SIGNIFICANT CHANGE UP (ref 80–100)
MONOCYTES # BLD AUTO: 0.72 K/UL — SIGNIFICANT CHANGE UP (ref 0–0.9)
MONOCYTES NFR BLD AUTO: 9.6 % — SIGNIFICANT CHANGE UP (ref 2–14)
NEUTROPHILS # BLD AUTO: 4.74 K/UL — SIGNIFICANT CHANGE UP (ref 1.8–7.4)
NEUTROPHILS NFR BLD AUTO: 63.2 % — SIGNIFICANT CHANGE UP (ref 43–77)
PLATELET # BLD AUTO: 247 K/UL — SIGNIFICANT CHANGE UP (ref 150–400)
POTASSIUM SERPL-MCNC: 5.1 MMOL/L — SIGNIFICANT CHANGE UP (ref 3.5–5.3)
POTASSIUM SERPL-SCNC: 5.1 MMOL/L — SIGNIFICANT CHANGE UP (ref 3.5–5.3)
RBC # BLD: 4.22 M/UL — SIGNIFICANT CHANGE UP (ref 4.2–5.8)
RBC # FLD: 13.3 % — SIGNIFICANT CHANGE UP (ref 10.3–14.5)
SODIUM SERPL-SCNC: 136 MMOL/L — SIGNIFICANT CHANGE UP (ref 135–145)
WBC # BLD: 7.5 K/UL — SIGNIFICANT CHANGE UP (ref 3.8–10.5)
WBC # FLD AUTO: 7.5 K/UL — SIGNIFICANT CHANGE UP (ref 3.8–10.5)

## 2024-01-23 PROCEDURE — 36415 COLL VENOUS BLD VENIPUNCTURE: CPT

## 2024-01-23 PROCEDURE — 80048 BASIC METABOLIC PNL TOTAL CA: CPT

## 2024-01-23 PROCEDURE — G0463: CPT

## 2024-01-23 PROCEDURE — 85025 COMPLETE CBC W/AUTO DIFF WBC: CPT

## 2024-01-23 PROCEDURE — 83036 HEMOGLOBIN GLYCOSYLATED A1C: CPT

## 2024-01-29 ENCOUNTER — APPOINTMENT (OUTPATIENT)
Dept: ORTHOPEDIC SURGERY | Facility: AMBULATORY SURGERY CENTER | Age: 69
End: 2024-01-29
Payer: MEDICARE

## 2024-01-29 PROCEDURE — 25000 INCISION OF TENDON SHEATH: CPT | Mod: LT

## 2024-01-29 PROCEDURE — 26455 INCISION OF FINGER TENDON: CPT | Mod: FA

## 2024-01-29 PROCEDURE — 29848 WRIST ENDOSCOPY/SURGERY: CPT | Mod: LT

## 2024-01-29 PROCEDURE — 64702 REVISE FINGER/TOE NERVE: CPT | Mod: FA

## 2024-02-01 NOTE — END OF VISIT
[Time Spent: ___ minutes] : I have spent [unfilled] minutes of time on the encounter. [TextEntry] : Discussed with pt at length regarding SHELL, obesity, SOBOE; reviewed spirometry and compliance with pt.

## 2024-02-01 NOTE — RESULTS/DATA
[TextEntry] : PSG from 5/20/17 revealed moderate SHELL with an AHI of 23.3. CPAP titration study from 7/9/17 revealed an optimal pressure of 12 cm of water. CXR from 12/11/23 was clear. Overall compliance is % and > 4 hr compliance is % with a residual AHI of 0.4-2.6, which is normal on a pressure of 12 cm of water.

## 2024-02-01 NOTE — CONSULT LETTER
[Dear  ___] : Dear  [unfilled], [Consult Letter:] : I had the pleasure of evaluating your patient, [unfilled]. [Please see my note below.] : Please see my note below. [Consult Closing:] : Thank you very much for allowing me to participate in the care of this patient.  If you have any questions, please do not hesitate to contact me. [Sincerely,] : Sincerely, [DrJenifer  ___] : Dr. HERNANDEZ [DrJenifer ___] : Dr. HERNANDEZ [FreeTextEntry3] : Deng Davis MD, FCCP, D. ABSM\par  Pulmonary and Sleep Medicine\par  [facility] [place]

## 2024-02-01 NOTE — HISTORY OF PRESENT ILLNESS
[None] : No associated symptoms are reported [CPAP] : CPAP [Good Compliance] : good compliance with treatment [Good Tolerance] : good tolerance of treatment [Good Symptom Control] : good symptom control [Follow-Up - Routine Clinic] : a routine clinic follow-up of [Currently Experiencing] : The patient is currently experiencing symptoms. [Dyspnea] : dyspnea [Back Pain] : back pain [Low Calorie Diet] : low calorie diet [Fair Compliance] : fair compliance with treatment [Fair Tolerance] : fair tolerance of treatment [Poor Symptom Control] : poor symptom control [Sleep Apnea] : sleep apnea [High] : high [Low Calorie] : low calorie [Well Balanced Diet] : well balanced meals [Infrequently] : exercises infrequently [Walking] : walking [TextBox_4] : Patient with mild SOBOE but is otherwise without respiratory complaints. He is s/p hernia surgery. He now follows with neurology for H/A. Pt is a former smoker of 1/2 ppd x 8 years, quit 1978. Pt is s/p back surgery and recently had pain stimulator with an implant in R temporal region. Pt is now back to using his PAP therapy but reports it is making noises. He tried oral appliance therapy recently but could not tolerate so wants to restart PAP therapy. [de-identified] : at 12 cm of water

## 2024-02-01 NOTE — DISCUSSION/SUMMARY
[FreeTextEntry1] : #1. CPAP titration study to treat moderate SHELL revealed an optimal pressure of 12 cm of water; he has excellent compliance and was doing well but with recent scalp surgery and now straps of mask are irritating the suture line so has difficulty using this therapy; he tried oral appliance therapy but could not tolerate. #2. Diet and exercise for weight loss. #3. SOBOE is likely related to weight given normal PFTs and spirometry. #4. Could consider repeat HST to re-evaluate severity of SHELL. Pt with prior dental evaluation for oral appliance therapy but could not tolerate so will try PAP therapy again with a new machine. #5. CXR given rales on exam. #6. Lung function was essentially normal previously; repeat given rales. #7. The patient does not appear to require BD therapy at this time. #8. Replace equipment as needed; ordered a new machine 1/15/24.  #9. Pt had both Covid vaccines and flu vaccine. #10. F/u in 2 months with compliance on new machine. #11. Neurology f/u for H/A.  The patient expressed understanding and agreement with the above recommendations/plan and accepts responsibility to be compliant with recommended testing, therapies, and f/u visits. All relevant questions and concerns were addressed.

## 2024-02-01 NOTE — PROCEDURE
[FreeTextEntry1] : PFTs performed previously were normal. Spirometry performed subsequently was essentially normal. Spirometry 6/18/21 - normal. PFTs 12/5/23 - Normal FVC and FEV1 without obstruction with mildly reduced lung volumes with mildly reduced DLCO which nearly normalizes when corrected for alveolar volume.

## 2024-02-14 ENCOUNTER — APPOINTMENT (OUTPATIENT)
Dept: ORTHOPEDIC SURGERY | Facility: CLINIC | Age: 69
End: 2024-02-14
Payer: MEDICARE

## 2024-02-14 DIAGNOSIS — M65.4 RADIAL STYLOID TENOSYNOVITIS [DE QUERVAIN]: ICD-10-CM

## 2024-02-14 DIAGNOSIS — G56.03 CARPAL TUNNEL SYNDROM,BILATERAL UPPER LIMBS: ICD-10-CM

## 2024-02-14 PROCEDURE — 99214 OFFICE O/P EST MOD 30 MIN: CPT | Mod: 24,25

## 2024-02-14 PROCEDURE — 20600 DRAIN/INJ JOINT/BURSA W/O US: CPT | Mod: 79,RT

## 2024-02-14 PROCEDURE — 20526 THER INJECTION CARP TUNNEL: CPT | Mod: 79,RT

## 2024-02-14 PROCEDURE — 20550 NJX 1 TENDON SHEATH/LIGAMENT: CPT | Mod: 79,RT

## 2024-02-14 NOTE — HISTORY OF PRESENT ILLNESS
[___ Weeks Post Op] : [unfilled] weeks post op [de-identified] : POS: left endoscopic carpal tunnel release, left de quervain's release, left trigger thumb release and digital neuroplasty.  DOS: 01/29/2024 [de-identified] : Returns for follow-up.  He also reports chronic exacerbated right hand and wrist discomfort as well as numbness and tingling.  He also reports right middle finger triggering.  The symptoms are bothersome during both daytime and nighttime and also affect his ADLs. [de-identified] : Incisions are healing beautifully.  There are no signs of infection.  Examination of the [right] wrist reveals discomfort with compression at the level of the volar carpal tunnel eliciting numbness/tingling throughout the fingertips.  Examination at the level of the [right] wrist reveals tenderness at the level of the first dorsal compartment with a positive finkelstein.    Examination of the hand(s) particularly at the A1 of the [right middle] reveals tenderness with a palpable click. [de-identified] : We are both delighted with the results.  Commence OT to further optimize outcome.  ASSESSMENT: The patient comes in today with chronic exacerbated right hand and wrist discomfort as well as numbness and tingling.  He also reports right middle finger triggering.  The symptoms are bothersome during both daytime and nighttime and also affect his ADLs.  His symptoms are consistent with right carpal tunnel syndrome, right de Quervain's tenosynovitis, and right middle trigger finger.  Treatment modalities were discussed, the patient elects for injections.   The patient was adequately and thoroughly informed of my assessment of their current condition(s).  - This may diminish bodily function for the extremity.  We discussed prognosis, treatment modalities including operative and nonoperative options for the above diagnostic assessment. As always, 2nd opinion is always provided as an option. For this, when accessible, I was able to review other physicians note(s) including reviewing other tests, imaging results as well as personally view these results for my own interpretation.      Injection:   The risks and benefits of a steroid injection were discussed in detail. The risks include but are not limited to: pain, infection, swelling, flare response, bleeding, subcutaneous fat atrophy, skin depigmentation and/or elevation of blood sugar. The risk of incomplete resolution of symptoms, recurrence and additional intervention was reviewed and considered by the patient.  The patient agreed to proceed and under a sterile prep, I injected 1 unit (6mg) into 1 cc of a combination of Celestone and Lidocaine into the [right carpal tunnel, right de Quervain's, right middle trigger]. The patient tolerated the injection well.   The patient was adequately and thoroughly informed of my assessment of their current condition(s).   [de-identified] : 1.  Injections as above.  Activity modifications.   2.  Commence OT.  Follow-up in 6 weeks.

## 2024-03-11 ENCOUNTER — TRANSCRIPTION ENCOUNTER (OUTPATIENT)
Age: 69
End: 2024-03-11

## 2024-03-12 ENCOUNTER — OUTPATIENT (OUTPATIENT)
Dept: OUTPATIENT SERVICES | Facility: HOSPITAL | Age: 69
LOS: 1 days | End: 2024-03-12
Payer: MEDICARE

## 2024-03-12 DIAGNOSIS — M54.81 OCCIPITAL NEURALGIA: ICD-10-CM

## 2024-03-12 PROCEDURE — 76000 FLUOROSCOPY <1 HR PHYS/QHP: CPT

## 2024-03-12 PROCEDURE — 64405 NJX AA&/STRD GR OCPL NRV: CPT | Mod: RT

## 2024-03-28 ENCOUNTER — APPOINTMENT (OUTPATIENT)
Dept: ORTHOPEDIC SURGERY | Facility: CLINIC | Age: 69
End: 2024-03-28

## 2024-03-28 DIAGNOSIS — M79.643 PAIN IN UNSPECIFIED HAND: ICD-10-CM

## 2024-03-28 DIAGNOSIS — M79.646 PAIN IN UNSPECIFIED HAND: ICD-10-CM

## 2024-03-28 DIAGNOSIS — M65.30 TRIGGER FINGER, UNSPECIFIED FINGER: ICD-10-CM

## 2024-03-28 PROCEDURE — 20526 THER INJECTION CARP TUNNEL: CPT | Mod: 59,79,RT

## 2024-03-28 PROCEDURE — 99214 OFFICE O/P EST MOD 30 MIN: CPT | Mod: 24,25

## 2024-03-28 PROCEDURE — 20550 NJX 1 TENDON SHEATH/LIGAMENT: CPT | Mod: 79,RT

## 2024-03-28 NOTE — HISTORY OF PRESENT ILLNESS
[___ Weeks Post Op] : [unfilled] weeks post op [de-identified] : POS: left endoscopic carpal tunnel release, left de quervain's release, left trigger thumb release and digital neuroplasty.  DOS: 01/29/2024 [de-identified] : Returns for follow-up.  He also reports chronic exacerbated right hand and wrist discomfort as well as numbness and tingling.  He also reports right middle and right thumb finger triggering.  The symptoms are bothersome during both daytime and nighttime and also affect his ADLs. [de-identified] : Incisions have healed beautifully.  He has excellent range of motion of the left hand with minimal to no stiffness.  Examination of the [right] wrist reveals discomfort with compression at the level of the volar carpal tunnel eliciting numbness/tingling throughout the fingertips.  Examination at the level of the [right] wrist reveals tenderness at the level of the first dorsal compartment with a positive finkelstein.    Examination of the hand(s) particularly at the A1 of the [right middle] reveals tenderness with a palpable click. [de-identified] : We are both delighted with the results.  Continue OT to further optimize outcome.  ASSESSMENT: The patient comes in today with chronic exacerbated right hand and wrist discomfort as well as numbness and tingling.  He also reports right middle and right thumb finger triggering.  The symptoms are bothersome during both daytime and nighttime and also affect his ADLs. His symptoms are consistent with right carpal tunnel syndrome, right trigger thumb, and right middle trigger finger.  Treatment modalities were discussed, the patient elects for repeat injections.   The patient was adequately and thoroughly informed of my assessment of their current condition(s).  - This may diminish bodily function for the extremity.  We discussed prognosis, treatment modalities including operative and nonoperative options for the above diagnostic assessment. As always, 2nd opinion is always provided as an option. For this, when accessible, I was able to review other physicians note(s) including reviewing other tests, imaging results as well as personally view these results for my own interpretation.      Injection:   The risks and benefits of a steroid injection were discussed in detail. The risks include but are not limited to: pain, infection, swelling, flare response, bleeding, subcutaneous fat atrophy, skin depigmentation and/or elevation of blood sugar. The risk of incomplete resolution of symptoms, recurrence and additional intervention was reviewed and considered by the patient.  The patient agreed to proceed and under a sterile prep, I injected 1 unit (6mg) into 1 cc of a combination of Celestone and Lidocaine into the [right carpal tunnel, right trigger thumb, right middle trigger]. The patient tolerated the injection well.   The patient was adequately and thoroughly informed of my assessment of their current condition(s).   [de-identified] : 1.  Injections as above.  Activity modifications.   2.  Continue OT and HEP.  Follow-up in 6 weeks.

## 2024-05-23 ENCOUNTER — APPOINTMENT (OUTPATIENT)
Dept: ORTHOPEDIC SURGERY | Facility: CLINIC | Age: 69
End: 2024-05-23

## 2024-05-23 VITALS
DIASTOLIC BLOOD PRESSURE: 70 MMHG | WEIGHT: 205 LBS | HEIGHT: 68.5 IN | HEART RATE: 71 BPM | BODY MASS INDEX: 30.71 KG/M2 | SYSTOLIC BLOOD PRESSURE: 114 MMHG

## 2024-05-23 DIAGNOSIS — M25.531 PAIN IN RIGHT WRIST: ICD-10-CM

## 2024-05-23 DIAGNOSIS — G56.00 CARPAL TUNNEL SYNDROME, UNSPECIFIED UPPER LIMB: ICD-10-CM

## 2024-05-23 DIAGNOSIS — M25.532 PAIN IN LEFT WRIST: ICD-10-CM

## 2024-05-23 DIAGNOSIS — M19.049 PRIMARY OSTEOARTHRITIS, UNSPECIFIED HAND: ICD-10-CM

## 2024-05-23 DIAGNOSIS — M19.039 PRIMARY OSTEOARTHRITIS, UNSPECIFIED WRIST: ICD-10-CM

## 2024-05-23 PROCEDURE — 20600 DRAIN/INJ JOINT/BURSA W/O US: CPT | Mod: LT

## 2024-05-23 PROCEDURE — 20526 THER INJECTION CARP TUNNEL: CPT | Mod: 59,RT

## 2024-05-23 PROCEDURE — 20605 DRAIN/INJ JOINT/BURSA W/O US: CPT | Mod: LT

## 2024-05-23 PROCEDURE — 99214 OFFICE O/P EST MOD 30 MIN: CPT | Mod: 25

## 2024-05-23 NOTE — ASSESSMENT
[FreeTextEntry1] : ASSESSMENT: The patient comes in today with known multiple chronic exacerbated symptoms including carpal tunnel disease .  He is suffering from tendinopathy in multiple sites as well as wrist arthropathy.  At this point in time he would like to proceed with injections for the above.  Furthermore, he does have radiographic and history evidence of wrist arthropathy bilaterally. At this stage I also recommend continued bracing as needed which he has been doing  Injection:   The risks and benefits of a steroid injection were discussed in detail. The risks include but are not limited to: pain, infection, swelling, flare response, bleeding, subcutaneous fat atrophy, skin depigmentation and/or elevation of blood sugar. The risk of incomplete resolution of symptoms, recurrence and additional intervention was reviewed and considered by the patient. The patient agreed to proceed and under a sterile prep, I injected 1 unit 6mg into 1 cc of a combination of Celestone and Lidocaine into the right carpal tunnel, left wrist joint, left thumb MCP joint. The patient tolerated the injection well.  DISCUSSION: 1.  Injections as above.  Activity modification for multiple triggers.  Follow-up 10 weeks

## 2024-05-23 NOTE — HISTORY OF PRESENT ILLNESS
[FreeTextEntry1] : Chad is a very pleasant 69-year-old male who presents today with a chronic history of bilateral wrist and hand discomfort.  He has had left-sided carpal tunnel release from which she is doing well.  At this point in time continues to complain of right-sided numbness and tingling as well.  Symptoms are inhibiting ADLs

## 2024-05-23 NOTE — PHYSICAL EXAM
[de-identified] : Examination of the [bilateral] wrist reveals discomfort with compression at the level of the volar carpal tunnel eliciting numbness/tingling throughout the fingertips Examination of the left wrist reveals significant effusion with crepitus upon motion.  .  There is tenderness at the radiocarpal joint as well with a palpable effusion.   There is pain with Aaron shift testing. P/S is painless.  Examination of the [right] wrist reveals discomfort with compression at the level of the volar carpal tunnel eliciting numbness/tingling throughout the fingertips Examination of the left thumb reveals tenderness at the level of the MCP joint and bogginess.  No signs of infection [de-identified] : [4] views of [bilateral hands and wrists] were reviewed by me and discussed with the patient.  These [show findings consistent with bilateral basal joint OA and findings of IP joint OA] He has bilateral scapholunate widening bilaterally and arthrosis at the metacarpal joint bilaterally

## 2024-06-17 ENCOUNTER — TRANSCRIPTION ENCOUNTER (OUTPATIENT)
Age: 69
End: 2024-06-17

## 2024-06-18 ENCOUNTER — OUTPATIENT (OUTPATIENT)
Dept: OUTPATIENT SERVICES | Facility: HOSPITAL | Age: 69
LOS: 1 days | End: 2024-06-18
Payer: MEDICARE

## 2024-06-18 DIAGNOSIS — M54.81 OCCIPITAL NEURALGIA: ICD-10-CM

## 2024-06-18 PROCEDURE — 76000 FLUOROSCOPY <1 HR PHYS/QHP: CPT

## 2024-06-18 PROCEDURE — 64405 NJX AA&/STRD GR OCPL NRV: CPT | Mod: RT

## 2024-07-11 ENCOUNTER — APPOINTMENT (OUTPATIENT)
Dept: ORTHOPEDIC SURGERY | Facility: CLINIC | Age: 69
End: 2024-07-11
Payer: MEDICARE

## 2024-07-11 VITALS
BODY MASS INDEX: 28.63 KG/M2 | HEIGHT: 70 IN | WEIGHT: 200 LBS | SYSTOLIC BLOOD PRESSURE: 163 MMHG | HEART RATE: 58 BPM | DIASTOLIC BLOOD PRESSURE: 95 MMHG

## 2024-07-11 PROCEDURE — 20610 DRAIN/INJ JOINT/BURSA W/O US: CPT | Mod: LT

## 2024-07-11 PROCEDURE — 99214 OFFICE O/P EST MOD 30 MIN: CPT | Mod: 25

## 2024-07-11 PROCEDURE — 73564 X-RAY EXAM KNEE 4 OR MORE: CPT | Mod: LT

## 2024-07-15 RX ORDER — MELOXICAM 15 MG/1
15 TABLET ORAL
Qty: 30 | Refills: 0 | Status: ACTIVE | COMMUNITY
Start: 2024-07-15 | End: 1900-01-01

## 2024-07-24 RX ORDER — CELECOXIB 200 MG/1
200 CAPSULE ORAL
Qty: 30 | Refills: 0 | Status: ACTIVE | COMMUNITY
Start: 2024-07-24 | End: 1900-01-01

## 2024-08-01 ENCOUNTER — APPOINTMENT (OUTPATIENT)
Dept: ORTHOPEDIC SURGERY | Facility: CLINIC | Age: 69
End: 2024-08-01

## 2024-08-01 VITALS
DIASTOLIC BLOOD PRESSURE: 58 MMHG | HEIGHT: 70 IN | SYSTOLIC BLOOD PRESSURE: 90 MMHG | BODY MASS INDEX: 28.63 KG/M2 | WEIGHT: 200 LBS | HEART RATE: 76 BPM

## 2024-08-01 DIAGNOSIS — M19.049 PRIMARY OSTEOARTHRITIS, UNSPECIFIED HAND: ICD-10-CM

## 2024-08-01 DIAGNOSIS — M65.30 TRIGGER FINGER, UNSPECIFIED FINGER: ICD-10-CM

## 2024-08-01 DIAGNOSIS — G56.00 CARPAL TUNNEL SYNDROME, UNSPECIFIED UPPER LIMB: ICD-10-CM

## 2024-08-01 DIAGNOSIS — M19.039 PRIMARY OSTEOARTHRITIS, UNSPECIFIED WRIST: ICD-10-CM

## 2024-08-01 PROCEDURE — 99214 OFFICE O/P EST MOD 30 MIN: CPT | Mod: 25

## 2024-08-01 PROCEDURE — 20605 DRAIN/INJ JOINT/BURSA W/O US: CPT | Mod: 59,LT

## 2024-08-01 PROCEDURE — 20550 NJX 1 TENDON SHEATH/LIGAMENT: CPT | Mod: 50,59

## 2024-08-01 PROCEDURE — 20526 THER INJECTION CARP TUNNEL: CPT | Mod: RT

## 2024-08-01 NOTE — ASSESSMENT
[FreeTextEntry1] : ASSESSMENT: The patient comes in today with known multiple chronic exacerbated symptoms including carpal tunnel disease .  He is suffering from tendinopathy in multiple sites as well as wrist arthropathy.  At this point in time he would like to proceed with injections for the above.  Furthermore, he does have radiographic and history evidence of wrist arthropathy bilaterally. At this stage I also recommend continued bracing as needed which he has been doing  Injection:   The risks and benefits of a steroid injection were discussed in detail. The risks include but are not limited to: pain, infection, swelling, flare response, bleeding, subcutaneous fat atrophy, skin depigmentation and/or elevation of blood sugar. The risk of incomplete resolution of symptoms, recurrence and additional intervention was reviewed and considered by the patient. The patient agreed to proceed and under a sterile prep, I injected 1 unit 6mg into 1 cc of a combination of Celestone and Lidocaine into the right carpal tunnel, left wrist joint, bilateral middle A1. The patient tolerated the injection well.  DISCUSSION: 1.  Injections as above.  Activity modification and follow-up 3 months

## 2024-08-01 NOTE — PHYSICAL EXAM
[de-identified] : Examination of the [bilateral] wrist reveals discomfort with compression at the level of the volar carpal tunnel eliciting numbness/tingling throughout the fingertips Examination of the left wrist reveals significant effusion with crepitus upon motion.  .  There is tenderness at the radiocarpal joint as well with a palpable effusion.   There is pain with Aaron shift testing. P/S is painless.  Examination of the [right] wrist reveals discomfort with compression at the level of the volar carpal tunnel eliciting numbness/tingling throughout the fingertips Examination of the left thumb reveals tenderness at the level of the MCP joint and bogginess.  No signs of infection  Examination of the hand(s)  particularly at the A1 of the bilateral middle reveals tenderness with a palpable click. [de-identified] : [4] views of [bilateral hands and wrists] were reviewed by me and discussed with the patient.  These [show findings consistent with bilateral basal joint OA and findings of IP joint OA] He has bilateral scapholunate widening bilaterally and arthrosis at the metacarpal joint bilaterally

## 2024-08-22 ENCOUNTER — APPOINTMENT (OUTPATIENT)
Dept: ORTHOPEDIC SURGERY | Facility: CLINIC | Age: 69
End: 2024-08-22
Payer: MEDICARE

## 2024-08-22 VITALS
DIASTOLIC BLOOD PRESSURE: 85 MMHG | SYSTOLIC BLOOD PRESSURE: 136 MMHG | HEIGHT: 70 IN | BODY MASS INDEX: 28.63 KG/M2 | WEIGHT: 200 LBS | HEART RATE: 97 BPM

## 2024-08-22 DIAGNOSIS — M17.11 UNILATERAL PRIMARY OSTEOARTHRITIS, RIGHT KNEE: ICD-10-CM

## 2024-08-22 PROCEDURE — 20610 DRAIN/INJ JOINT/BURSA W/O US: CPT | Mod: LT

## 2024-08-22 PROCEDURE — 99213 OFFICE O/P EST LOW 20 MIN: CPT | Mod: 25

## 2024-08-22 PROCEDURE — 73564 X-RAY EXAM KNEE 4 OR MORE: CPT | Mod: LT

## 2024-08-22 RX ORDER — DICLOFENAC SODIUM 75 MG/1
75 TABLET, DELAYED RELEASE ORAL
Qty: 60 | Refills: 0 | Status: ACTIVE | COMMUNITY
Start: 2024-08-22 | End: 1900-01-01

## 2024-08-22 NOTE — DISCUSSION/SUMMARY
[de-identified] :  SHIRLEY RAMIREZ is a 69 year old male who presents with left knee osteoarthritis.  Nonoperative treatment options for knee arthritis were discussed including anti-inflammatories, physical therapy, intraarticular cortisone injections, and hyaluronic acid gel injections.  He will potentially need partial versus TKR in the future. I recommended a course of Diclofenac. The patient was given a prescription for the Diclofenac with directions. The patient was instructed to stop the medicine and call the office if there are any adverse reaction to the medicine. The patient was also instructed to consult with their primary care doctor prior to starting the medication. The patient received the first of five gel injections to the left knee and tolerated well. Follow up in one week.

## 2024-08-22 NOTE — PROCEDURE
[de-identified] : Allergies: The patient denies allergies to medications and has no allergies to chicken,eggs, or feathers. Procedure: The patient has been identified by name and date of birth. Patient confirms that we are treating the left knee today. The knee was prepped in the usual sterile fashion. The areas were cleansed with chlorhexadine, then sprayed with ethyl chloride. The patient was then injected with the Genvisc into the left knee in the anterolateral position. The patient tolerated the procedure well. The medication was delivered aseptically and atraumatically. Diagnosis: Osteoarthritis of the left knee Treatment: The patient was advised on the activities for today. I gave the patient instructions on postinjection ice and analgesia.

## 2024-08-22 NOTE — PHYSICAL EXAM
[de-identified] :  The patient appears well nourished and in no apparent distress. The patient is alert and oriented to person, place, and time. Affect and mood appear normal. The head is normocephalic and atraumatic. The eyes reveal normal sclera and extra ocular muscles are intact. The tongue is midline with no apparent lesions. Skin shows normal turgor with no evidence of eczema or psoriasis. No respiratory distress noted. Sensation grossly intact. [de-identified] :  Exam of the left knee shows -3 to 105 degrees of flexion measured with a goniometer. There is a small effusion. Pain limits flexion.  There is palpable medial joint line tenderness. 5/5 motor strength bilaterally distally. Sensation intact distally. [de-identified] : X-ray: 4 views of the left knee demonstrate joint space narrowing, mild patellofemoral arthritis

## 2024-08-22 NOTE — HISTORY OF PRESENT ILLNESS
[de-identified] :  SHIRLEY RAMIREZ is a 69 year old male who presents with left knee pain. His has had pain for the past three days, starting when he tweaked his knee while moving thing down from the attic. He notes the knee became swollen and painful. His pain is localized medially, worse with deep flexion and weight bearing. Patient is status post arthroscopy 8 years ago. He was seen in office on 7/11/24 and had a steroid injection to the left knee. This worked only somewhat. He is interested in trying gel injections.

## 2024-08-23 ENCOUNTER — APPOINTMENT (OUTPATIENT)
Dept: ORTHOPEDIC SURGERY | Facility: CLINIC | Age: 69
End: 2024-08-23

## 2024-08-28 ENCOUNTER — APPOINTMENT (OUTPATIENT)
Dept: ORTHOPEDIC SURGERY | Facility: CLINIC | Age: 69
End: 2024-08-28
Payer: MEDICARE

## 2024-08-28 PROBLEM — M17.12 PRIMARY LOCALIZED OSTEOARTHRITIS OF LEFT KNEE: Status: ACTIVE | Noted: 2024-08-28

## 2024-08-28 PROCEDURE — 20610 DRAIN/INJ JOINT/BURSA W/O US: CPT | Mod: LT

## 2024-08-28 NOTE — HISTORY OF PRESENT ILLNESS
[de-identified] : The patient is here today for a genvisc injection for the left knee. The patient is having osteoarthritic symptoms.  Allergies: The patient denies allergies to medications and has no allergies to chicken,eggs, or feathers. Procedure: The patient has been identified by name and date of birth. Patient confirms that we are treating the left knee today. The knee was prepped in the usual sterile fashion. The areas were cleansed with chlorhexadine, then sprayed with ethyl chloride. The patient was then injected with the Genvisc into the left knee in the anterolateral position. The patient tolerated the procedure well. The medication was delivered aseptically and atraumatically. Diagnosis: Osteoarthritis of the left knee Treatment: The patient was advised on the activities for today. I gave the patient instructions on postinjection ice and analgesia. Follow up in one week for the next injection.

## 2024-08-28 NOTE — REASON FOR VISIT
[Follow-Up Visit] : a follow-up visit for [Other: ____] : [unfilled] [FreeTextEntry2] : Mobile2Me #2 Lot #[id number] 2026/09/30

## 2024-09-04 ENCOUNTER — APPOINTMENT (OUTPATIENT)
Dept: ORTHOPEDIC SURGERY | Facility: CLINIC | Age: 69
End: 2024-09-04
Payer: MEDICARE

## 2024-09-04 VITALS — HEIGHT: 70 IN | WEIGHT: 200 LBS | BODY MASS INDEX: 28.63 KG/M2

## 2024-09-04 DIAGNOSIS — M17.12 UNILATERAL PRIMARY OSTEOARTHRITIS, LEFT KNEE: ICD-10-CM

## 2024-09-04 PROCEDURE — 20610 DRAIN/INJ JOINT/BURSA W/O US: CPT | Mod: LT

## 2024-09-04 NOTE — REASON FOR VISIT
[Follow-Up Visit] : a follow-up visit for [FreeTextEntry2] : Left Knee Genvisc #3 Lot #[id number] 2026/09/30.

## 2024-09-04 NOTE — HISTORY OF PRESENT ILLNESS
[de-identified] : The patient is here today for a genvisc injection for the left knee. The patient is having osteoarthritic symptoms.  Allergies: The patient denies allergies to medications and has no allergies to chicken,eggs, or feathers. Procedure: The patient has been identified by name and date of birth. Patient confirms that we are treating the left knee today. The knee was prepped in the usual sterile fashion. The areas were cleansed with chlorhexadine, then sprayed with ethyl chloride. The patient was then injected with the Genvisc into the left knee in the anterolateral position. The patient tolerated the procedure well. The medication was delivered aseptically and atraumatically. Diagnosis: Osteoarthritis of the left knee Treatment: The patient was advised on the activities for today. I gave the patient instructions on postinjection ice and analgesia. Follow up in one week for the next injection.

## 2024-09-18 ENCOUNTER — APPOINTMENT (OUTPATIENT)
Dept: ORTHOPEDIC SURGERY | Facility: CLINIC | Age: 69
End: 2024-09-18
Payer: MEDICARE

## 2024-09-18 VITALS — BODY MASS INDEX: 28.63 KG/M2 | WEIGHT: 200 LBS | HEIGHT: 70 IN

## 2024-09-18 DIAGNOSIS — M17.12 UNILATERAL PRIMARY OSTEOARTHRITIS, LEFT KNEE: ICD-10-CM

## 2024-09-18 PROCEDURE — 20610 DRAIN/INJ JOINT/BURSA W/O US: CPT | Mod: LT

## 2024-09-18 NOTE — REASON FOR VISIT
[Follow-Up Visit] : a follow-up visit for [FreeTextEntry2] :  Left Knee Genvisc #4 Lot #[id number] 2026/09/30.

## 2024-09-18 NOTE — HISTORY OF PRESENT ILLNESS
[de-identified] : The patient is here today for a genvisc injection for the left knee. The patient is having osteoarthritic symptoms.  Allergies: The patient denies allergies to medications and has no allergies to chicken,eggs, or feathers. Procedure: The patient has been identified by name and date of birth. Patient confirms that we are treating the left knee today. The knee was prepped in the usual sterile fashion. The areas were cleansed with chlorhexadine, then sprayed with ethyl chloride. The patient was then injected with the Genvisc into the left knee in the anterolateral position. The patient tolerated the procedure well. The medication was delivered aseptically and atraumatically. Diagnosis: Osteoarthritis of the left knee Treatment: The patient was advised on the activities for today. I gave the patient instructions on postinjection ice and analgesia. Follow up in one week for the next injection.

## 2024-10-02 ENCOUNTER — APPOINTMENT (OUTPATIENT)
Dept: ORTHOPEDIC SURGERY | Facility: CLINIC | Age: 69
End: 2024-10-02
Payer: MEDICARE

## 2024-10-02 VITALS — HEIGHT: 70 IN | BODY MASS INDEX: 28.63 KG/M2 | WEIGHT: 200 LBS

## 2024-10-02 DIAGNOSIS — M17.12 UNILATERAL PRIMARY OSTEOARTHRITIS, LEFT KNEE: ICD-10-CM

## 2024-10-02 PROCEDURE — 20610 DRAIN/INJ JOINT/BURSA W/O US: CPT | Mod: LT

## 2024-10-02 NOTE — REASON FOR VISIT
[Follow-Up Visit] : a follow-up visit for [FreeTextEntry2] :  Left Knee Genvisc #5 Lot #[id number] 2026/09/30.

## 2024-10-02 NOTE — HISTORY OF PRESENT ILLNESS
[de-identified] : The patient is here today for the fifth genvisc injection for the left knee. The patient is having osteoarthritic symptoms.  Allergies: The patient denies allergies to medications and has no allergies to chicken,eggs, or feathers. Procedure: The patient has been identified by name and date of birth. Patient confirms that we are treating the left knee today. The knee was prepped in the usual sterile fashion. The areas were cleansed with chlorhexadine, then sprayed with ethyl chloride. The patient was then injected with the Genvisc into the left knee in the anterolateral position. The patient tolerated the procedure well. The medication was delivered aseptically and atraumatically. Diagnosis: Osteoarthritis of the left knee Treatment: The patient was advised on the activities for today. I gave the patient instructions on postinjection ice and analgesia. Follow up in 6 weeks

## 2024-10-15 ENCOUNTER — APPOINTMENT (OUTPATIENT)
Dept: ORTHOPEDIC SURGERY | Facility: CLINIC | Age: 69
End: 2024-10-15

## 2024-10-15 DIAGNOSIS — M65.30 TRIGGER FINGER, UNSPECIFIED FINGER: ICD-10-CM

## 2024-10-15 DIAGNOSIS — G56.00 CARPAL TUNNEL SYNDROME, UNSPECIFIED UPPER LIMB: ICD-10-CM

## 2024-10-15 DIAGNOSIS — M19.039 PRIMARY OSTEOARTHRITIS, UNSPECIFIED WRIST: ICD-10-CM

## 2024-10-15 PROCEDURE — 20526 THER INJECTION CARP TUNNEL: CPT | Mod: 59,RT

## 2024-10-15 PROCEDURE — 20550 NJX 1 TENDON SHEATH/LIGAMENT: CPT | Mod: RT

## 2024-10-15 PROCEDURE — 99214 OFFICE O/P EST MOD 30 MIN: CPT | Mod: 25
